# Patient Record
Sex: FEMALE | Race: WHITE | NOT HISPANIC OR LATINO | Employment: OTHER | URBAN - METROPOLITAN AREA
[De-identification: names, ages, dates, MRNs, and addresses within clinical notes are randomized per-mention and may not be internally consistent; named-entity substitution may affect disease eponyms.]

---

## 2017-12-27 ENCOUNTER — THERAPY VISIT (OUTPATIENT)
Dept: PHYSICAL THERAPY | Facility: CLINIC | Age: 62
End: 2017-12-27
Payer: COMMERCIAL

## 2017-12-27 DIAGNOSIS — M25.512 LEFT SHOULDER PAIN, UNSPECIFIED CHRONICITY: Primary | ICD-10-CM

## 2017-12-27 PROCEDURE — 97112 NEUROMUSCULAR REEDUCATION: CPT | Mod: GP | Performed by: PHYSICAL THERAPIST

## 2017-12-27 PROCEDURE — 97161 PT EVAL LOW COMPLEX 20 MIN: CPT | Mod: GP | Performed by: PHYSICAL THERAPIST

## 2017-12-27 NOTE — MR AVS SNAPSHOT
"              After Visit Summary   12/27/2017    Ada Rock    MRN: [id number]           Patient Information     Date Of Birth          1955        Visit Information        Provider Department      12/27/2017 4:00 PM Florida Aldridge, PT [facility] Athletic [facility] Physical [facility]        Today's Diagnoses     Left shoulder pain, unspecified chronicity    -  1       Follow-ups after your visit        Your next 10 appointments already scheduled     Feb 02, 2018 12:10 PM CST   JUDAH Extremity with Gerry Suero PT   [facility] Physical Therapy ([facility]  )    [address] [id number] [phone]              Who to contact     If you have questions or need follow up information about today's clinic visit or your schedule please contact [facility] directly at [phone].  Normal or non-critical lab and imaging results will be communicated to you by BlockScorehart, letter or phone within 4 business days after the clinic has received the results. If you do not hear from us within 7 days, please contact the clinic through BlockScorehart or phone. If you have a critical or abnormal lab result, we will notify you by phone as soon as possible.  Submit refill requests through Accolade or call your pharmacy and they will forward the refill request to us. Please allow 3 business days for your refill to be completed.          Additional Information About Your Visit        BlockScorehart Information     Accolade lets you send messages to your doctor, view your test results, renew your prescriptions, schedule appointments and more. To sign up, go to [email or web address] . Click on \"Log in\" on the left side of the screen, which will take you to the Welcome page. Then click on \"Sign up Now\" on the right side of the page.     You will be asked to enter the access code listed below, as well as some personal " information. Please follow the directions to create your username and password.     Your access code is: [email or web address]  Expires: 2018 11:10 AM     Your access code will  in 90 days. If you need help or a new code, please call your [facility] clinic or [phone].        Care EveryWhere ID     This is your Care EveryWhere ID. This could be used by other organizations to access your [facility] medical records  [id number]         Blood Pressure from Last 3 Encounters:   No data found for BP    Weight from Last 3 Encounters:   No data found for Wt              We Performed the Following     JUDAH Inital Eval Report     Neuromuscular Re-Education     PT Eval, Low Complexity ([id number])        Primary Care Provider Fax #    Physician No Ref-Primary [phone]       No address on file        Equal Access to Services     STEPHANIE JACKSON : Lima kelsey Somor, waaxda luqadaha, qaybta kaalmada adeegyada, tiffany antoine . So [facility] [phone].    ATENCIÓN: Si habla español, tiene a whipple disposición servicios gratuitos de asistencia lingüística. Llame al [phone].    We comply with applicable federal civil rights laws and [place] laws. We do not discriminate on the basis of race, color, national origin, age, disability, sex, sexual orientation, or gender identity.            Thank you!     Thank you for choosing [facility] PHYSICAL THERAPY  for your care. Our goal is always to provide you with excellent care. Hearing back from our patients is one way we can continue to improve our services. Please take a few minutes to complete the written survey that you may receive in the mail after your visit with us. Thank you!             Your Updated Medication List - Protect others around you: Learn how to safely use, store and throw away your medicines at www.disposemymeds.org.      Notice  As of 2017 11:59 PM    You have not been prescribed any  medications.

## 2017-12-27 NOTE — LETTER
[facility] ATHLETIC [facility] PHYSICAL [facility]  [address] [address]  [phone]    January 15, 2018    Re: Ada Rock   :   1955  MRN:  [id number]   REFERRING PHYSICIAN:   Mikhail Contreras    [facility] ATHLETIC [facility] PHYSICAL [facility]  Date of Initial Evaluation:  18  Visits:  Rxs Used: 1  Reason for Referral:  Left shoulder pain, unspecified chronicity    EVALUATION SUMMARY    [facility] Athletic [facility] Initial Evaluation  Subjective:  Patient is a 62 year old female presenting with rehab left shoulder hpi. The history is provided by the patient.   Ada Rock is a 62 year old female with a left shoulder condition.        Onset of L shoulder pain (upper trap area) in 2017 when she started riding a new road bike. Bike trip to [place] in September increased the pain. She notes the pain is manageable and does not limit her, but she wants to prevent it from getting worse. She is confident the bike frame is the right size for her, but she has not had the bike fitted for her. She plans to have the bike fitted over the winter.     L RC injury about 10 years ago, improved with PT. No residual pain. .    Site of Pain: L upper trap area.  Radiates to: no radiation.  Pain is described as aching and is intermittent Pain Scale: 0-4/10 pain range.  Associated with: denies associated symptoms. Worse during: not dependent upon the time of day.  Symptoms are exacerbated by using arm overhead (pain after riding bike - weight bearing through arms) and relieved by activity/movement (deep tissue massage).  Since onset symptoms are gradually improving.  Special tests:  X-ray.  Previous treatment: no treatment for current episode of pain other than massage.    General health as reported by patient is excellent.                  Objective:      Cervical/Thoracic Evaluation  Arom wnl cervical: Cervical AROM did not reproduce pain  complaints.       AROM:  AROM Cervical:  Flexion:            90%  Extension:       75%  Rotation:         Left: 75%     Right: 90%  Side Bend:      Left: 50%     Right:  50%    Shoulder Evaluation:  ROM:  AROM:  normal (except for slight endrange loss L shoulder with flexion and abduction without pain)    PROM:  normal  Re: Ada Rock    Strength:  normal (Global MMT of B shoulder 5/5, but with L scap weakness - tends to elevate and protract L scap with resistive testing. L scap dyskinesis with repeated movement testing.)    Special Tests:    Left shoulder negative for the following special tests:  Impingement and Rotator cuff tear  Right shoulder negative for the following special tests:Impingement and Rotator cuff tear    Palpation:  Palpation assessed shoulder: Increased tissue tension noted at L upper trap and L levator scapulae.    Mobility Tests:    Glenohumeral posterior left:  Normal  Glenohumeral posterior right:  Normal  Glenohumeral inferior left:  Normal  Glenohumeral inferior right:  Normal    Acromioclavicular left:  Normal  Acromioclavicular right:  Normal    Scapulothoracic left:  Normal  Scapulothoracic right:  Normal  Sternoclavicular left:  Normal  Sternoclavicular right:  Normal  Scapulohumeral rhythm right:  Hypermobile  Scapulohumeral rhythm right:  Normal       Assessment/Plan:    Patient is a 62 year old female with left side shoulder complaints.    Patient has the following significant findings with corresponding treatment plan.                Diagnosis 1:  L shoulder pain, scapular dyskinesis  Pain -  hot/cold therapy, manual therapy, self management, education and home program  Decreased ROM/flexibility - manual therapy, therapeutic exercise and home program  Decreased joint mobility - manual therapy, therapeutic exercise and home program  Decreased strength - therapeutic exercise, therapeutic activities and home program  Decreased function - therapeutic activities and home  program    Therapy Evaluation Codes:   1) History comprised of:   Personal factors that impact the plan of care:      None.    Comorbidity factors that impact the plan of care are:      None.     Medications impacting care: None.  2) Examination of Body Systems comprised of:   Body structures and functions that impact the plan of care:      Cervical spine, Shoulder and Thoracic Spine.   Activity limitations that impact the plan of care are:      Dressing, Sports and Reaching.  3) Clinical presentation characteristics are:   Stable/Uncomplicated.  4) Decision-Making    Low complexity using standardized patient assessment instrument and/or measureable assessment of functional outcome.  Cumulative Therapy Evaluation is: Low complexity.    Previous and current functional limitations:  (See Goal Flow Sheet for this information)    Short term and Long term goals: (See Goal Flow Sheet for this information)     Communication ability:  Patient appears to be able to clearly communicate and understand verbal and written communication and follow directions correctly.  Treatment Explanation - The following has been discussed with the patient:   RX ordered/plan of care  Anticipated outcomes  Possible risks and side effects  Re: Ada Rock    This patient would benefit from PT intervention to resume normal activities.   Rehab potential is excellent.    Frequency:  1 X week, once daily  Duration:  for 3 weeks tapering to 2 X a month over 6 weeks  Discharge Plan:  Achieve all LTG.  Independent in home treatment program.  Reach maximal therapeutic benefit.    Please refer to the daily flowsheet for treatment today, total treatment time and time spent performing 1:1 timed codes.     Thank you for your referral.    INQUIRIES  Therapist: Florida Aldridge DPT  [facility] PHYSICAL THERAPY  [address] [place] [id number]  Phone: [phone]  Fax: [phone]

## 2017-12-27 NOTE — PROGRESS NOTES
[facility] Initial Evaluation  Subjective:  Patient is a 62 year old female presenting with rehab left shoulder hpi. The history is provided by the patient.   Ada Rock is a 62 year old female with a left shoulder condition.        Onset of L shoulder pain (upper trap area) in June 2017 when she started riding a new road bike. Bike trip to [place] in September increased the pain. She notes the pain is manageable and does not limit her, but she wants to prevent it from getting worse. She is confident the bike frame is the right size for her, but she has not had the bike fitted for her. She plans to have the bike fitted over the winter.     L RC injury about 10 years ago, improved with PT. No residual pain. .    Site of Pain: L upper trap area.  Radiates to: no radiation.  Pain is described as aching and is intermittent Pain Scale: 0-4/10 pain range.  Associated with: denies associated symptoms. Worse during: not dependent upon the time of day.  Symptoms are exacerbated by using arm overhead (pain after riding bike - weight bearing through arms) and relieved by activity/movement (deep tissue massage).  Since onset symptoms are gradually improving.  Special tests:  X-ray.  Previous treatment: no treatment for current episode of pain other than massage.    General health as reported by patient is excellent.                                              Objective:  System              Cervical/Thoracic Evaluation  Arom wnl cervical: Cervical AROM did not reproduce pain complaints.     AROM:  AROM Cervical:    Flexion:            90%  Extension:       75%  Rotation:         Left: 75%     Right: 90%  Side Bend:      Left: 50%     Right:  50%                               Shoulder Evaluation:  ROM:  AROM:  normal (except for slight endrange loss L shoulder with flexion and abduction without pain)                            PROM:  normal                                Strength:  normal  (Global MMT of B shoulder 5/5, but with L scap weakness - tends to elevate and protract L scap with resistive testing. L scap dyskinesis with repeated movement testing.)                        Special Tests:      Left shoulder negative for the following special tests:  Impingement and Rotator cuff tear    Right shoulder negative for the following special tests:Impingement and Rotator cuff tear  Palpation:  Palpation assessed shoulder: Increased tissue tension noted at L upper trap and L levator scapulae.      Mobility Tests:      Glenohumeral posterior left:  Normal  Glenohumeral posterior right:  Normal  Glenohumeral inferior left:  Normal  Glenohumeral inferior right:  Normal    Acromioclavicular left:  Normal  Acromioclavicular right:  Normal    Scapulothoracic left:  Normal  Scapulothoracic right:  Normal  Sternoclavicular left:  Normal  Sternoclavicular right:  Normal  Scapulohumeral rhythm right:  Hypermobile  Scapulohumeral rhythm right:  Normal                                   General     ROS    Assessment/Plan:    Patient is a 62 year old female with left side shoulder complaints.    Patient has the following significant findings with corresponding treatment plan.                Diagnosis 1:  L shoulder pain, scapular dyskinesis  Pain -  hot/cold therapy, manual therapy, self management, education and home program  Decreased ROM/flexibility - manual therapy, therapeutic exercise and home program  Decreased joint mobility - manual therapy, therapeutic exercise and home program  Decreased strength - therapeutic exercise, therapeutic activities and home program  Decreased function - therapeutic activities and home program    Therapy Evaluation Codes:   1) History comprised of:   Personal factors that impact the plan of care:      None.    Comorbidity factors that impact the plan of care are:      None.     Medications impacting care: None.  2) Examination of Body Systems comprised of:   Body structures and  functions that impact the plan of care:      Cervical spine, Shoulder and Thoracic Spine.   Activity limitations that impact the plan of care are:      Dressing, Sports and Reaching.  3) Clinical presentation characteristics are:   Stable/Uncomplicated.  4) Decision-Making    Low complexity using standardized patient assessment instrument and/or measureable assessment of functional outcome.  Cumulative Therapy Evaluation is: Low complexity.    Previous and current functional limitations:  (See Goal Flow Sheet for this information)    Short term and Long term goals: (See Goal Flow Sheet for this information)     Communication ability:  Patient appears to be able to clearly communicate and understand verbal and written communication and follow directions correctly.  Treatment Explanation - The following has been discussed with the patient:   RX ordered/plan of care  Anticipated outcomes  Possible risks and side effects  This patient would benefit from PT intervention to resume normal activities.   Rehab potential is excellent.    Frequency:  1 X week, once daily  Duration:  for 3 weeks tapering to 2 X a month over 6 weeks  Discharge Plan:  Achieve all LTG.  Independent in home treatment program.  Reach maximal therapeutic benefit.    Please refer to the daily flowsheet for treatment today, total treatment time and time spent performing 1:1 timed codes.

## 2018-01-05 ENCOUNTER — THERAPY VISIT (OUTPATIENT)
Dept: PHYSICAL THERAPY | Facility: CLINIC | Age: 63
End: 2018-01-05
Payer: COMMERCIAL

## 2018-01-05 DIAGNOSIS — M25.512 LEFT SHOULDER PAIN, UNSPECIFIED CHRONICITY: Primary | ICD-10-CM

## 2018-01-05 PROCEDURE — 97110 THERAPEUTIC EXERCISES: CPT | Mod: GP | Performed by: PHYSICAL THERAPIST

## 2018-01-05 PROCEDURE — 97112 NEUROMUSCULAR REEDUCATION: CPT | Mod: GP | Performed by: PHYSICAL THERAPIST

## 2018-01-12 PROBLEM — M25.512 SHOULDER PAIN, LEFT: Status: ACTIVE | Noted: 2018-01-12

## 2018-07-09 ENCOUNTER — OFFICE VISIT (OUTPATIENT)
Dept: URGENT CARE | Facility: URGENT CARE | Age: 63
End: 2018-07-09
Payer: COMMERCIAL

## 2018-07-09 VITALS
SYSTOLIC BLOOD PRESSURE: 110 MMHG | BODY MASS INDEX: 18.78 KG/M2 | OXYGEN SATURATION: 97 % | HEIGHT: 64 IN | TEMPERATURE: 98 F | HEART RATE: 70 BPM | RESPIRATION RATE: 16 BRPM | WEIGHT: 110 LBS | DIASTOLIC BLOOD PRESSURE: 60 MMHG

## 2018-07-09 DIAGNOSIS — J06.9 VIRAL UPPER RESPIRATORY TRACT INFECTION: Primary | ICD-10-CM

## 2018-07-09 PROCEDURE — 99203 OFFICE O/P NEW LOW 30 MIN: CPT | Performed by: PHYSICIAN ASSISTANT

## 2018-07-09 RX ORDER — CODEINE PHOSPHATE AND GUAIFENESIN 10; 100 MG/5ML; MG/5ML
1 SOLUTION ORAL EVERY 4 HOURS PRN
Qty: 120 ML | Refills: 0 | Status: SHIPPED | OUTPATIENT
Start: 2018-07-09 | End: 2023-01-17

## 2018-07-09 RX ORDER — ALBUTEROL SULFATE 90 UG/1
2 AEROSOL, METERED RESPIRATORY (INHALATION) EVERY 6 HOURS PRN
Qty: 1 INHALER | Refills: 0 | Status: SHIPPED | OUTPATIENT
Start: 2018-07-09 | End: 2023-01-17

## 2018-07-10 NOTE — PROGRESS NOTES
"SUBJECTIVE:  Ada Rock is a 63 year old female who presents to the clinic today with a chief complaint of cough  for 5 day(s). Patient was recently hiking in [place] [facility] and developed a cough.   Her cough is described as productive and wheezy at night.    The patient's symptoms are moderate and stable.  Associated symptoms include wheezing. The patient's symptoms are exacerbated by no particular triggers  Patient has been using OTC cough suppressants  to improve symptoms.    No past medical history on file.    Current Outpatient Prescriptions   Medication Sig Dispense Refill     albuterol (PROAIR HFA/PROVENTIL HFA/VENTOLIN HFA) 108 (90 Base) MCG/ACT Inhaler Inhale 2 puffs into the lungs every 6 hours as needed for shortness of breath / dyspnea or wheezing 1 Inhaler 0     guaiFENesin-codeine (ROBITUSSIN AC) 100-10 MG/5ML SOLN solution Take 5 mLs by mouth every 4 hours as needed for cough 120 mL 0       Social History   Substance Use Topics     Smoking status: Never Smoker     Smokeless tobacco: Never Used     Alcohol use Not on file       ROS  10 Review of systems negative except as stated above.    OBJECTIVE:  /60  Pulse 70  Temp 98  F (36.7  C) (Oral)  Resp 16  Ht 5' 4\" (1.626 m)  Wt 110 lb (49.9 kg)  SpO2 97%  Breastfeeding? No  BMI 18.88 kg/m2  GENERAL APPEARANCE: healthy, alert and no distress  EYES: EOMI,  PERRL, conjunctiva clear  HENT: ear canals and TM's normal.  Nose and mouth without ulcers, erythema or lesions  NECK: supple, nontender, no lymphadenopathy  RESP: lungs clear to auscultation - no rales, rhonchi or wheezes  CV: regular rates and rhythm, normal S1 S2, no murmur noted  NEURO: Normal strength and tone, sensory exam grossly normal,  normal speech and mentation  SKIN: no suspicious lesions or rashes    ASSESSMENT / PLAN:  1. Viral upper respiratory tract infection  Symptomatic measures encouraged, humidified air, plenty of fluids.  - guaiFENesin-codeine (ROBITUSSIN AC) 100-10 " MG/5ML SOLN solution; Take 5 mLs by mouth every 4 hours as needed for cough  Dispense: 120 mL; Refill: 0  - albuterol (PROAIR HFA/PROVENTIL HFA/VENTOLIN HFA) 108 (90 Base) MCG/ACT Inhaler; Inhale 2 puffs into the lungs every 6 hours as needed for shortness of breath / dyspnea or wheezing  Dispense: 1 Inhaler; Refill: 0    Flory Cee PA-C

## 2019-02-28 ENCOUNTER — THERAPY VISIT (OUTPATIENT)
Dept: PHYSICAL THERAPY | Facility: CLINIC | Age: 64
End: 2019-02-28
Payer: COMMERCIAL

## 2019-02-28 DIAGNOSIS — M75.01 ADHESIVE CAPSULITIS OF RIGHT SHOULDER: ICD-10-CM

## 2019-02-28 PROCEDURE — 97112 NEUROMUSCULAR REEDUCATION: CPT | Mod: GP | Performed by: PHYSICAL THERAPIST

## 2019-02-28 PROCEDURE — 97110 THERAPEUTIC EXERCISES: CPT | Mod: GP | Performed by: PHYSICAL THERAPIST

## 2019-02-28 PROCEDURE — 97162 PT EVAL MOD COMPLEX 30 MIN: CPT | Mod: GP | Performed by: PHYSICAL THERAPIST

## 2019-02-28 NOTE — LETTER
[facility] ATHLETIC [facility] PHYSICAL [facility]  [address] [address]  [phone]  2019    Re: Ada Rock   :   1955  MRN:  [id number]   REFERRING PHYSICIAN:  Deepika Reardon MD    [facility]    Date of Initial Evaluation:  19  Visits:  Rxs Used: 1  Reason for Referral:  Adhesive capsulitis of right shoulder    EVALUATION SUMMARY    [facility]    Subjective:  Pt presents to PT with a chief complaint of R shoulder pain and stiffness with reported onset in 10/2018 with correlation to overuse with biking and lifting. Pt received cortizone injection yesterday and reports improvement in pain but continues to have c/o stiffness.   Ada Rock is a 63 year old female with a right shoulder condition.  Condition occurred with:  Lifting and repetition/overuse.  Condition occurred: during recreation/sport.  This is a new condition  10/2018.    Patient reports pain:  Anterior and lateral.  Radiates to:  Upper arm.  Pain is described as aching and is intermittent and reported as 8/10.  Associated symptoms:  Loss of motion/stiffness and loss of strength. Pain is worse in the P.M. and worse during the night.  Symptoms are exacerbated by lying on extremity, using arm at shoulder level, lifting and using arm overhead and relieved by NSAID's.  Since onset symptoms are gradually improving.        General health as reported by patient is excellent.  Pertinent medical history includes:  None.  Medical allergies: no.  Other surgeries include:  None reported.  Current medications:  None as reported by the patient.  Current occupation is dentist.  Patient is working in normal job without restrictions.  Primary job tasks include:  Prolonged standing and repetitive tasks.  Barriers include:  None as reported by the patient.  Red flags:  None as reported by the  patient.    Objective:  Standing Alignment:    Cervical/Thoracic:  Forward head  Shoulder/UE:  Rounded shoulders    Shoulder Evaluation:  ROM:  PROM:    Flexion:  Left:  180    Right: 120    Abduction:  Left:  180    Right:  140  External Rotation:  Left:  70    Right:  40  Pain: pain w/ end range R shoulder PROM=AROM  Re: Ada Rock   :   1955    Strength:    Flexion: Left:5/5   Pain:    Right: 4+/5      Pain:  +  Abduction:  Left: 5-/5  Pain:    Right: 4+/5      Pain:+  Internal Rotation:  Left:5-/5     Pain:    Right: 5-/5      Pain:+  External Rotation:   Left:4+/5     Pain:   Right:4+/5     Pain:      Mobility Tests:    Glenohumeral posterior right:  Hypomobile    Assessment/Plan:    Patient is a 63 year old female with right side shoulder complaints.    Patient has the following significant findings with corresponding treatment plan.                Diagnosis 1:  R adhesive capsulitis  Pain -  manual therapy, splint/taping/bracing/orthotics, self management and education  Decreased ROM/flexibility - manual therapy and therapeutic exercise  Decreased joint mobility - manual therapy and therapeutic exercise  Decreased strength - therapeutic exercise and therapeutic activities  Impaired muscle performance - neuro re-education  Impaired posture - neuro re-education    Therapy Evaluation Codes:   1) History comprised of:   Personal factors that impact the plan of care:      None.    Comorbidity factors that impact the plan of care are:      None.     Medications impacting care: None.  2) Examination of Body Systems comprised of:   Body structures and functions that impact the plan of care:      Shoulder.   Activity limitations that impact the plan of care are:      Bathing, Cooking, Dressing and Lifting.  3) Clinical presentation characteristics are:   Evolving/Changing.  4) Decision-Making    Moderate complexity using standardized patient assessment instrument and/or   measureable assessment of  functional outcome.  Cumulative Therapy Evaluation is: Moderate complexity.    Previous and current functional limitations:  (See Goal Flow Sheet for this information)    Short term and Long term goals: (See Goal Flow Sheet for this information)     Communication ability:  Patient appears to be able to clearly communicate and understand verbal and written communication and follow directions correctly.  Treatment Explanation - The following has been discussed with the patient:   RX ordered/plan of care  Anticipated outcomes  Possible risks and side effects  This patient would benefit from PT intervention to resume normal activities.   Rehab potential is good.  Re: Ada Rock   :   1955    Frequency:  2 X week, once daily  Duration:  for 4 weeks tapering to 2 X a month for 2 months  Discharge Plan:  Achieve all LTG.  Independent in home treatment program.  Reach maximal therapeutic benefit.    Please refer to the daily flowsheet for treatment today, total treatment time and time spent performing 1:1 timed codes.         Thank you for your referral.    INQUIRIES  Therapist: Gerry Suero DPT  [facility] PHYSICAL THERAPY  [address] [place] [id number]  Phone: [phone]  Fax: [phone]

## 2019-03-04 PROBLEM — M75.01 ADHESIVE CAPSULITIS OF RIGHT SHOULDER: Status: ACTIVE | Noted: 2019-03-04

## 2019-03-05 ENCOUNTER — THERAPY VISIT (OUTPATIENT)
Dept: PHYSICAL THERAPY | Facility: CLINIC | Age: 64
End: 2019-03-05
Payer: COMMERCIAL

## 2019-03-05 DIAGNOSIS — M75.01 ADHESIVE CAPSULITIS OF RIGHT SHOULDER: ICD-10-CM

## 2019-03-05 PROCEDURE — 97110 THERAPEUTIC EXERCISES: CPT | Mod: GP | Performed by: PHYSICAL THERAPIST

## 2019-03-05 PROCEDURE — 97112 NEUROMUSCULAR REEDUCATION: CPT | Mod: GP | Performed by: PHYSICAL THERAPIST

## 2019-03-05 PROCEDURE — 97140 MANUAL THERAPY 1/> REGIONS: CPT | Mod: GP | Performed by: PHYSICAL THERAPIST

## 2019-03-05 NOTE — PROGRESS NOTES
[facility] Initial Evaluation    Subjective:  Pt presents to PT with a chief complaint of R shoulder pain and stiffness with reported onset in 10/2018 with correlation to overuse with biking and lifting. Pt received cortizone injection yesterday and reports improvement in pain but continues to have c/o stiffness.       Ada Rock is a 63 year old female with a right shoulder condition.  Condition occurred with:  Lifting and repetition/overuse.  Condition occurred: during recreation/sport.  This is a new condition  10/2018.    Patient reports pain:  Anterior and lateral.  Radiates to:  Upper arm.  Pain is described as aching and is intermittent and reported as 8/10.  Associated symptoms:  Loss of motion/stiffness and loss of strength. Pain is worse in the P.M. and worse during the night.  Symptoms are exacerbated by lying on extremity, using arm at shoulder level, lifting and using arm overhead and relieved by NSAID's.  Since onset symptoms are gradually improving.        General health as reported by patient is excellent.  Pertinent medical history includes:  None.  Medical allergies: no.  Other surgeries include:  None reported.  Current medications:  None as reported by the patient.  Current occupation is dentist.  Patient is working in normal job without restrictions.  Primary job tasks include:  Prolonged standing and repetitive tasks.    Barriers include:  None as reported by the patient.    Red flags:  None as reported by the patient.                        Objective:  Standing Alignment:    Cervical/Thoracic:  Forward head  Shoulder/UE:  Rounded shoulders                                       Shoulder Evaluation:  ROM:    PROM:    Flexion:  Left:  180    Right: 120      Abduction:  Left:  180    Right:  140      External Rotation:  Left:  70    Right:  40                Pain: pain w/ end range R shoulder PROM=AROM    Strength:    Flexion: Left:5/5   Pain:    Right: 4+/5      Pain:   +    Abduction:  Left: 5-/5  Pain:    Right: 4+/5      Pain:+    Internal Rotation:  Left:5-/5     Pain:    Right: 5-/5      Pain:+  External Rotation:   Left:4+/5     Pain:   Right:4+/5     Pain:                  Mobility Tests:      Glenohumeral posterior right:  Hypomobile                                             General     ROS    Assessment/Plan:    Patient is a 63 year old female with right side shoulder complaints.    Patient has the following significant findings with corresponding treatment plan.                Diagnosis 1:  R adhesive capsulitis  Pain -  manual therapy, splint/taping/bracing/orthotics, self management and education  Decreased ROM/flexibility - manual therapy and therapeutic exercise  Decreased joint mobility - manual therapy and therapeutic exercise  Decreased strength - therapeutic exercise and therapeutic activities  Impaired muscle performance - neuro re-education  Impaired posture - neuro re-education    Therapy Evaluation Codes:   1) History comprised of:   Personal factors that impact the plan of care:      None.    Comorbidity factors that impact the plan of care are:      None.     Medications impacting care: None.  2) Examination of Body Systems comprised of:   Body structures and functions that impact the plan of care:      Shoulder.   Activity limitations that impact the plan of care are:      Bathing, Cooking, Dressing and Lifting.  3) Clinical presentation characteristics are:   Evolving/Changing.  4) Decision-Making    Moderate complexity using standardized patient assessment instrument and/or measureable assessment of functional outcome.  Cumulative Therapy Evaluation is: Moderate complexity.    Previous and current functional limitations:  (See Goal Flow Sheet for this information)    Short term and Long term goals: (See Goal Flow Sheet for this information)     Communication ability:  Patient appears to be able to clearly communicate and understand verbal and written  communication and follow directions correctly.  Treatment Explanation - The following has been discussed with the patient:   RX ordered/plan of care  Anticipated outcomes  Possible risks and side effects  This patient would benefit from PT intervention to resume normal activities.   Rehab potential is good.    Frequency:  2 X week, once daily  Duration:  for 4 weeks tapering to 2 X a month for 2 months  Discharge Plan:  Achieve all LTG.  Independent in home treatment program.  Reach maximal therapeutic benefit.    Please refer to the daily flowsheet for treatment today, total treatment time and time spent performing 1:1 timed codes.

## 2019-03-14 ENCOUNTER — THERAPY VISIT (OUTPATIENT)
Dept: PHYSICAL THERAPY | Facility: CLINIC | Age: 64
End: 2019-03-14
Payer: COMMERCIAL

## 2019-03-14 DIAGNOSIS — M75.01 ADHESIVE CAPSULITIS OF RIGHT SHOULDER: ICD-10-CM

## 2019-03-14 PROCEDURE — 97112 NEUROMUSCULAR REEDUCATION: CPT | Mod: GP | Performed by: PHYSICAL THERAPIST

## 2019-03-14 PROCEDURE — 97140 MANUAL THERAPY 1/> REGIONS: CPT | Mod: GP | Performed by: PHYSICAL THERAPIST

## 2019-03-14 PROCEDURE — 97110 THERAPEUTIC EXERCISES: CPT | Mod: GP | Performed by: PHYSICAL THERAPIST

## 2019-03-28 ENCOUNTER — THERAPY VISIT (OUTPATIENT)
Dept: PHYSICAL THERAPY | Facility: CLINIC | Age: 64
End: 2019-03-28
Payer: COMMERCIAL

## 2019-03-28 DIAGNOSIS — M75.01 ADHESIVE CAPSULITIS OF RIGHT SHOULDER: ICD-10-CM

## 2019-03-28 PROCEDURE — 97112 NEUROMUSCULAR REEDUCATION: CPT | Mod: GP | Performed by: PHYSICAL THERAPIST

## 2019-03-28 PROCEDURE — 97110 THERAPEUTIC EXERCISES: CPT | Mod: GP | Performed by: PHYSICAL THERAPIST

## 2019-03-28 NOTE — LETTER
[facility] PHYSICAL THERAPY  [address] [address]  [phone]    2019    Re: Ada Rock   :   1955  MRN:  [id number]   REFERRING PHYSICIAN:   Deepika Reardon MD    [facility] PHYSICAL [facility]    Date of Initial Evaluation:  2019  Visits:  Rxs Used: 4  Reason for Referral:  Adhesive capsulitis of right shoulder    PROGRESS  REPORT    Progress reporting period is from 19 to 3/28/19. Pt has attended 4 total PT sessions addressing patient's chief complaints of R shoulder adhesive capsulitis with reported onset in 10/2018 with correlation to overuse with biking and lifting. Pt received CSI and reported significant improvement in pain and PT has focused on a progressive shoulder ROM program with recent capsular stretching. Patient has demonstrated significant improvement in R shoulder AROM and PROM and is appropriate for progression to strengthening program and eventual independent HEP w/ f/u as needed        SUBJECTIVE  Subjective: Pt reports doing much better overall and has been very pleased with her progress. Pt does still acknowledge some tightness with certain shoulder motions (reaching across body) but much improved overall. HEP going well     Current Pain level: 3/10.     Initial Pain level: 8/10.   Changes in function:  Yes (See Goal flowsheet attached for changes in current functional level)  Adverse reaction to treatment or activity: None    OBJECTIVE  Changes noted in objective findings:      R shoulder AROM:  Flexion: 160 deg  Abduction: 160 deg  ER: 60 deg  IR: thumb to T8      Painfree resisted shoulder ER, IR, Abduction, and Flexion    ASSESSMENT/PLAN  Updated problem list and treatment plan: Diagnosis 1:  R shoulder adhesive capsulitis    STG/LTGs have been met or progress has been made towards goals:  Yes (See Goal flow sheet completed today.)  Re: Ada Rock   :    1955    Assessment of Progress: The patient's condition is improving.  Self Management Plans:  Patient has been instructed in a home treatment program.  I have re-evaluated this patient and find that the nature, scope, duration and intensity of the therapy is appropriate for the medical condition of the patient.  Ada continues to require the following intervention to meet STG and LTG's:  PT    Recommendations:  This patient would benefit from continued therapy.     Frequency:  2 X a month, once daily  Duration:  for 1 months    Please refer to the daily flowsheet for treatment today, total treatment time and time spent performing 1:1 timed codes.    Thank you for your referral.      INQUIRIES  Therapist: Gerry Suero, PT, DPT  [facility] PHYSICAL THERAPY  [address] [place] [id number]  Phone: [phone]  Fax: [phone]

## 2019-03-29 NOTE — PROGRESS NOTES
PROGRESS  REPORT    Progress reporting period is from 2/28/19 to 3/28/19. Pt has attended 4 total PT sessions addressing patient's chief complaints of R shoulder adhesive capsulitis with reported onset in 10/2018 with correlation to overuse with biking and lifting. Pt received CSI and reported significant improvement in pain and PT has focused on a progressive shoulder ROM program with recent capsular stretching. Patient has demonstrated significant improvement in R shoulder AROM and PROM and is appropriate for progression to strengthening program and eventual independent HEP w/ f/u as needed         SUBJECTIVE  Subjective: Pt reports doing much better overall and has been very pleased with her progress. Pt does still acknowledge some tightness with certain shoulder motions (reaching across body) but much improved overall. HEP going well     Current Pain level: 3/10.     Initial Pain level: 8/10.   Changes in function:  Yes (See Goal flowsheet attached for changes in current functional level)  Adverse reaction to treatment or activity: None    OBJECTIVE  Changes noted in objective findings:      R shoulder AROM:  Flexion: 160 deg  Abduction: 160 deg  ER: 60 deg  IR: thumb to T8      Painfree resisted shoulder ER, IR, Abduction, and Flexion    ASSESSMENT/PLAN  Updated problem list and treatment plan: Diagnosis 1:  R shoulder adhesive capsulitis    STG/LTGs have been met or progress has been made towards goals:  Yes (See Goal flow sheet completed today.)  Assessment of Progress: The patient's condition is improving.  Self Management Plans:  Patient has been instructed in a home treatment program.  I have re-evaluated this patient and find that the nature, scope, duration and intensity of the therapy is appropriate for the medical condition of the patient.  Ada continues to require the following intervention to meet STG and LTG's:  PT    Recommendations:  This patient would benefit from continued therapy.      Frequency:  2 X a month, once daily  Duration:  for 1 months        Please refer to the daily flowsheet for treatment today, total treatment time and time spent performing 1:1 timed codes.

## 2019-04-18 ENCOUNTER — THERAPY VISIT (OUTPATIENT)
Dept: PHYSICAL THERAPY | Facility: CLINIC | Age: 64
End: 2019-04-18
Payer: COMMERCIAL

## 2019-04-18 DIAGNOSIS — M75.01 ADHESIVE CAPSULITIS OF RIGHT SHOULDER: ICD-10-CM

## 2019-04-18 PROCEDURE — 97112 NEUROMUSCULAR REEDUCATION: CPT | Mod: GP | Performed by: PHYSICAL THERAPIST

## 2019-04-18 PROCEDURE — 97110 THERAPEUTIC EXERCISES: CPT | Mod: GP | Performed by: PHYSICAL THERAPIST

## 2019-11-14 ENCOUNTER — THERAPY VISIT (OUTPATIENT)
Dept: PHYSICAL THERAPY | Facility: CLINIC | Age: 64
End: 2019-11-14
Payer: COMMERCIAL

## 2019-11-14 DIAGNOSIS — M75.01 ADHESIVE CAPSULITIS OF RIGHT SHOULDER: ICD-10-CM

## 2019-11-14 PROCEDURE — 97161 PT EVAL LOW COMPLEX 20 MIN: CPT | Mod: GP | Performed by: PHYSICAL THERAPIST

## 2019-11-14 PROCEDURE — 97110 THERAPEUTIC EXERCISES: CPT | Mod: GP | Performed by: PHYSICAL THERAPIST

## 2019-11-14 PROCEDURE — 97112 NEUROMUSCULAR REEDUCATION: CPT | Mod: GP | Performed by: PHYSICAL THERAPIST

## 2019-11-15 PROBLEM — M25.512 SHOULDER PAIN, LEFT: Status: RESOLVED | Noted: 2018-01-12 | Resolved: 2019-11-15

## 2019-11-15 NOTE — PROGRESS NOTES
PROGRESS  REPORT    Progress reporting period is from 3/28/19 to 11/14/19. Pt returns to PT with a flare up of her previous R shoulder pain associated with adhesive capsulitis. Pt previously attended 4 PT sessions (02/2019 to 03/2019) addressing patient's chief complaints of R shoulder adhesive capsulitis with reported onset in 10/2018 with correlation to overuse with biking and lifting. Pt reported significant improvement in pain and ROM with a primary treatment emphasis on a progressive shoulder ROM program with capsular stretching and a home strengthening program.     Pt reports poor adherence to her HEP and had a recent flare up of her shoulder pain this past month 10/2019. Pt had a f/u with MD who recommended PT for progressive shoulder ROM and eventual scapular stabilization. MRI revealed moderate RTC tendinosis w/o tearing. Pt received another CSI last week and reports considerable improvement in pain, however feels her R shoulder stiffness has persisted. Pt hoping to return to her previously successful PT program          SUBJECTIVE  Subjective changes noted by patient:  Pt reports increased R lateral shoulder pain over the past month. Potential correlation to completing a tooth extraction at work   Current pain level is 5/10  .     Previous pain level was  8/10  .   Changes in function:  Yes (See Goal flowsheet attached for changes in current functional level)  Adverse reaction to treatment or activity: None    OBJECTIVE  Changes noted in objective findings:  Yes,       R shoulder AROM:  Flexion: 130 deg  Abduction: 130 deg  ER: 20 deg  IR: thumb to L1       Pain with resisted abduction (4/5, pain ++), ER (4/5, pain ++), and IR (5/5, pain +)    Discussed plans to return to shoulder AAROM with eventual capsular stretching and scapular stabilization    ASSESSMENT/PLAN  Updated problem list and treatment plan: Diagnosis 1:  R shoulder pain, adhesive capsulitis  Pain -  manual therapy,  splint/taping/bracing/orthotics, self management and education  Decreased ROM/flexibility - manual therapy and therapeutic exercise  Decreased joint mobility - manual therapy and therapeutic exercise  Decreased strength - therapeutic exercise and therapeutic activities  Impaired muscle performance - neuro re-education  STG/LTGs have been met or progress has been made towards goals:  Yes (See Goal flow sheet completed today.)  Assessment of Progress: The patient's condition is improving.  Self Management Plans:  Patient has been instructed in a home treatment program.  Patient is independent in a home treatment program.  I have re-evaluated this patient and find that the nature, scope, duration and intensity of the therapy is appropriate for the medical condition of the patient.  Ada continues to require the following intervention to meet STG and LTG's:  PT    Recommendations:  This patient would benefit from continued therapy.     Frequency:  1 X week, once daily  Duration:  for 6 weeks then 2x month for 1 month    This patient is ready to be discharged from therapy and continue their home treatment program.    Please refer to the daily flowsheet for treatment today, total treatment time and time spent performing 1:1 timed codes.

## 2019-11-29 ENCOUNTER — THERAPY VISIT (OUTPATIENT)
Dept: PHYSICAL THERAPY | Facility: CLINIC | Age: 64
End: 2019-11-29
Payer: COMMERCIAL

## 2019-11-29 DIAGNOSIS — M75.01 ADHESIVE CAPSULITIS OF RIGHT SHOULDER: ICD-10-CM

## 2019-11-29 PROCEDURE — 97140 MANUAL THERAPY 1/> REGIONS: CPT | Mod: GP | Performed by: PHYSICAL THERAPIST

## 2019-11-29 PROCEDURE — 97112 NEUROMUSCULAR REEDUCATION: CPT | Mod: GP | Performed by: PHYSICAL THERAPIST

## 2019-11-29 PROCEDURE — 97110 THERAPEUTIC EXERCISES: CPT | Mod: GP | Performed by: PHYSICAL THERAPIST

## 2019-12-12 ENCOUNTER — THERAPY VISIT (OUTPATIENT)
Dept: PHYSICAL THERAPY | Facility: CLINIC | Age: 64
End: 2019-12-12
Payer: COMMERCIAL

## 2019-12-12 DIAGNOSIS — M75.01 ADHESIVE CAPSULITIS OF RIGHT SHOULDER: ICD-10-CM

## 2019-12-12 PROCEDURE — 97112 NEUROMUSCULAR REEDUCATION: CPT | Mod: GP | Performed by: PHYSICAL THERAPIST

## 2019-12-12 PROCEDURE — 97110 THERAPEUTIC EXERCISES: CPT | Mod: GP | Performed by: PHYSICAL THERAPIST

## 2019-12-16 NOTE — PROGRESS NOTES
Discharge Note    Progress reporting period is from last progress note on 03/28/19 to Apr 18, 2019.    Ada came for 1 of 2 planned follow up appointments.  Please see information below for last relevant information on current status.  Patient seen for 5 visits.    SUBJECTIVE  Subjective changes noted by patient:  Doing well overall, denies any shoulder pain. Still has some stiffness at end range elevation but much better overall  .  Current pain level is 0/10.     Previous pain level was  8/10.   Changes in function:  Yes (See Goal flowsheet attached for changes in current functional level)  Adverse reaction to treatment or activity: None    OBJECTIVE  Changes noted in objective findings: Mild loss of R shoulder elevation, no significant change from last session. Tolerated progression in hold time with capsular stretches     ASSESSMENT/PLAN  Diagnosis: R adhesive capsulitis   Updated problem list and treatment plan:   Pain - HEP  Decreased ROM/flexibility - HEP  Decreased strength - HEP  Impaired muscle performance - HEP  Impaired posture - HEP  Decreased joint mobility - HEP  STG/LTGs have been met or progress has been made towards goals:  Yes, please see goal flowsheet for most current information  Assessment of Progress: current status is unknown.    Last current status: Pt is progressing well   Self Management Plans:  HEP  I have re-evaluated this patient and find that the nature, scope, duration and intensity of the therapy is appropriate for the medical condition of the patient.  Ada continues to require the following intervention to meet STG and LTG's:  HEP.    Recommendations:  Discharge with current home program.  Patient to follow up with MD as needed.    Please refer to the daily flowsheet for treatment today, total treatment time and time spent performing 1:1 timed codes.

## 2019-12-27 ENCOUNTER — THERAPY VISIT (OUTPATIENT)
Dept: PHYSICAL THERAPY | Facility: CLINIC | Age: 64
End: 2019-12-27
Payer: COMMERCIAL

## 2019-12-27 DIAGNOSIS — M75.01 ADHESIVE CAPSULITIS OF RIGHT SHOULDER: ICD-10-CM

## 2019-12-27 PROCEDURE — 97112 NEUROMUSCULAR REEDUCATION: CPT | Mod: GP | Performed by: PHYSICAL THERAPIST

## 2019-12-27 PROCEDURE — 97110 THERAPEUTIC EXERCISES: CPT | Mod: GP | Performed by: PHYSICAL THERAPIST

## 2019-12-27 NOTE — LETTER
[facility] PHYSICAL THERAPY  [address] [place] [address]  [phone]    2019    Re: Ada Rock   :   1955  MRN:  [id number]   REFERRING PHYSICIAN:   Sebastian Tang MD    [facility] ATHLETIC [facility] PHYSICAL [facility]    Date of Initial Evaluation:  19  Visits:  Rxs Used: 9  Reason for Referral:  Adhesive capsulitis of right shoulder        PROGRESS  REPORT    Progress reporting period is from 19 to 19. Pt has attended 4 PT sessions addressing patient's exacerbation of her R shoulder pain associated with adhesive capsulitis. Pt previously attended PT sessions (2019 to 2019) addressing patient's chief complaints of R shoulder adhesive capsulitis with reported onset in 10/2018 with correlation to overuse with biking and lifting. Pt reported significant improvement in pain and ROM with a primary treatment emphasis on a progressive shoulder ROM program with capsular stretching and a home strengthening program.     However, pt reported poor adherence to her HEP and had a flare up of her shoulder pain in 10/2019. Pt had a f/u with MD who recommended PT for progressive shoulder ROM and eventual scapular stabilization. MRI revealed moderate RTC tendinosis w/o tearing. Pt received another CSI in 2019 and reported considerable improvement in pain, and tolerated a progressive shoulder ROM/stretching program with eventual progression to capsular stretching and a home RTC/scapular strengthening program. Pt to continue HEP independently and f/u as needed    SUBJECTIVE  Subjective: Pt reports doin very well overall and has been pleased with her progress. Still has some R shoulder soreness with reaching overhead, but much improved    Current Pain level: 1/10.     Initial Pain level: 8/10.   Changes in function:  Yes (See Goal flowsheet attached for changes in current functional level)  Adverse reaction to  treatment or activity: None              Re: Ada Rock   :   1955        OBJECTIVE  Changes noted in objective findings:  Yes,     Mild loss of R shoulder Flexion and ER PROM remains (cont capsular stretches)    R shoulder AROM  Flexion: 160 deg  Abduction: 160 deg  ER:  40 deg  IR: thumb to  T12    Painfree resisted isometric testing of the shoulder, tolerated progression to strengthening but emphasized need for continued shoulder ROM stretching     ASSESSMENT/PLAN  Updated problem list and treatment plan: Diagnosis 1:  R shoulder adhesive capsulitis    STG/LTGs have been met or progress has been made towards goals:  Yes (See Goal flow sheet completed today.)  Assessment of Progress: The patient's condition is improving.  Self Management Plans:  Patient has been instructed in a home treatment program.  I have re-evaluated this patient and find that the nature, scope, duration and intensity of the therapy is appropriate for the medical condition of the patient.  Ada continues to require the following intervention to meet STG and LTG's:  PT    Recommendations:  This patient is ready to be discharged from therapy and continue their home treatment program.    Please refer to the daily flowsheet for treatment today, total treatment time and time spent performing 1:1 timed codes.        Thank you for your referral.    INQUIRIES  Therapist: Gerry Suero DPT   [facility] PHYSICAL THERAPY  [address] [place] [address]  Phone: [phone]  Fax: [phone]

## 2019-12-31 PROBLEM — M75.01 ADHESIVE CAPSULITIS OF RIGHT SHOULDER: Status: RESOLVED | Noted: 2019-03-04 | Resolved: 2019-12-31

## 2019-12-31 NOTE — PROGRESS NOTES
PROGRESS  REPORT    Progress reporting period is from 11/14/19 to 12/27/19. Pt has attended 4 PT sessions addressing patient's exacerbation of her R shoulder pain associated with adhesive capsulitis. Pt previously attended PT sessions (02/2019 to 03/2019) addressing patient's chief complaints of R shoulder adhesive capsulitis with reported onset in 10/2018 with correlation to overuse with biking and lifting. Pt reported significant improvement in pain and ROM with a primary treatment emphasis on a progressive shoulder ROM program with capsular stretching and a home strengthening program.     However, pt reported poor adherence to her HEP and had a flare up of her shoulder pain in 10/2019. Pt had a f/u with MD who recommended PT for progressive shoulder ROM and eventual scapular stabilization. MRI revealed moderate RTC tendinosis w/o tearing. Pt received another CSI in 11/2019 and reported considerable improvement in pain, and tolerated a progressive shoulder ROM/stretching program with eventual progression to capsular stretching and a home RTC/scapular strengthening program. Pt to continue HEP independently and f/u as needed    SUBJECTIVE  Subjective: Pt reports doin very well overall and has been pleased with her progress. Still has some R shoulder soreness with reaching overhead, but much improved    Current Pain level: 1/10.     Initial Pain level: 8/10.   Changes in function:  Yes (See Goal flowsheet attached for changes in current functional level)  Adverse reaction to treatment or activity: None    OBJECTIVE  Changes noted in objective findings:  Yes,     Mild loss of R shoulder Flexion and ER PROM remains (cont capsular stretches)    R shoulder AROM  Flexion: 160 deg  Abduction: 160 deg  ER: 40 deg  IR: thumb to T12      Painfree resisted isometric testing of the shoulder, tolerated progression to strengthening but emphasized need for continued shoulder ROM stretching     ASSESSMENT/PLAN  Updated problem list  and treatment plan: Diagnosis 1:  R shoulder adhesive capsulitis    STG/LTGs have been met or progress has been made towards goals:  Yes (See Goal flow sheet completed today.)  Assessment of Progress: The patient's condition is improving.  Self Management Plans:  Patient has been instructed in a home treatment program.  I have re-evaluated this patient and find that the nature, scope, duration and intensity of the therapy is appropriate for the medical condition of the patient.  Ada continues to require the following intervention to meet STG and LTG's:  PT    Recommendations:  This patient is ready to be discharged from therapy and continue their home treatment program.    Please refer to the daily flowsheet for treatment today, total treatment time and time spent performing 1:1 timed codes.

## 2022-02-18 ENCOUNTER — OFFICE VISIT (OUTPATIENT)
Dept: URGENT CARE | Facility: URGENT CARE | Age: 67
End: 2022-02-18
Payer: COMMERCIAL

## 2022-02-18 VITALS
OXYGEN SATURATION: 98 % | RESPIRATION RATE: 16 BRPM | HEIGHT: 64 IN | TEMPERATURE: 98 F | HEART RATE: 56 BPM | WEIGHT: 116 LBS | BODY MASS INDEX: 19.81 KG/M2 | SYSTOLIC BLOOD PRESSURE: 123 MMHG | DIASTOLIC BLOOD PRESSURE: 74 MMHG

## 2022-02-18 DIAGNOSIS — R30.0 DYSURIA: Primary | ICD-10-CM

## 2022-02-18 LAB
ALBUMIN UR-MCNC: NEGATIVE MG/DL
APPEARANCE UR: CLEAR
BACTERIA #/AREA URNS HPF: ABNORMAL /HPF
BILIRUB UR QL STRIP: NEGATIVE
COLOR UR AUTO: YELLOW
GLUCOSE UR STRIP-MCNC: NEGATIVE MG/DL
HGB UR QL STRIP: ABNORMAL
KETONES UR STRIP-MCNC: NEGATIVE MG/DL
LEUKOCYTE ESTERASE UR QL STRIP: NEGATIVE
NITRATE UR QL: NEGATIVE
PH UR STRIP: 5 [PH] (ref 5–7)
RBC #/AREA URNS AUTO: ABNORMAL /HPF
SP GR UR STRIP: 1.02 (ref 1–1.03)
SQUAMOUS #/AREA URNS AUTO: ABNORMAL /LPF
UROBILINOGEN UR STRIP-ACNC: 0.2 E.U./DL
WBC #/AREA URNS AUTO: ABNORMAL /HPF

## 2022-02-18 PROCEDURE — 99203 OFFICE O/P NEW LOW 30 MIN: CPT | Performed by: PHYSICIAN ASSISTANT

## 2022-02-18 PROCEDURE — 81001 URINALYSIS AUTO W/SCOPE: CPT | Performed by: PHYSICIAN ASSISTANT

## 2022-02-18 NOTE — PROGRESS NOTES
Dysuria  - UA macro with reflex to Microscopic and Culture - Clinc Collect  - Urine Microscopic    Follow up with PCP in 2 weeks if symptoms persist.      MARJAN Landa [facility] URGENT [facility]    Subjective   66 year old who presents to clinic today for the following health issues:    Urgent Care and UTI       HPI     Genitourinary - Female  Onset/Duration: 2 weeks  Description:   Painful urination (Dysuria): no           Frequency: YES  Blood in urine (Hematuria): no  Delay in urine (Hesitency): YES  Intensity: mild  Progression of Symptoms:  same  Accompanying Signs & Symptoms:  Fever/chills: no  Flank pain: no  Nausea and vomiting: no  Vaginal symptoms: none  Abdominal/Pelvic Pain: YES  History:   History of frequent UTI s: no  History of kidney stones: no  Precipitating or alleviating factors: None  Therapies tried and outcome:  None     Review of Systems   Review of Systems   See HPI     Objective    Temp: 98  F (36.7  C) Temp src: Temporal BP: 123/74 Pulse: 56   Resp: 16 SpO2: 98 %       Physical Exam   Physical Exam  Constitutional:       General: She is not in acute distress.     Appearance: Normal appearance. She is normal weight. She is not ill-appearing, toxic-appearing or diaphoretic.   HENT:      Head: Normocephalic and atraumatic.   Cardiovascular:      Rate and Rhythm: Normal rate.      Pulses: Normal pulses.   Pulmonary:      Effort: Pulmonary effort is normal. No respiratory distress.      Breath sounds: Normal breath sounds.   Abdominal:      Tenderness: There is no right CVA tenderness or left CVA tenderness.   Neurological:      General: No focal deficit present.      Mental Status: She is alert and oriented to person, place, and time. Mental status is at baseline.      Gait: Gait normal.   Psychiatric:         Mood and Affect: Mood normal.         Behavior: Behavior normal.         Thought Content: Thought content normal.         Judgment: Judgment normal.          No results  found for this or any previous visit (from the past 24 hour(s)).

## 2023-01-17 ENCOUNTER — OFFICE VISIT (OUTPATIENT)
Dept: VASCULAR SURGERY | Facility: CLINIC | Age: 68
End: 2023-01-17
Payer: MEDICARE

## 2023-01-17 ENCOUNTER — TELEPHONE (OUTPATIENT)
Dept: VASCULAR SURGERY | Facility: CLINIC | Age: 68
End: 2023-01-17

## 2023-01-17 DIAGNOSIS — I78.1 SPIDER VEINS: ICD-10-CM

## 2023-01-17 DIAGNOSIS — I83.90 ASYMPTOMATIC VARICOSE VEINS: Primary | ICD-10-CM

## 2023-01-17 PROCEDURE — 99203 OFFICE O/P NEW LOW 30 MIN: CPT | Performed by: SURGERY

## 2023-01-17 NOTE — TELEPHONE ENCOUNTER
Compression Hose Order  Pt would like 1 pair(s) of beige, open-toe, thigh high, 20-30mmhg compression hose.    Please have patient's compression hose ready on back shelf in [place] for patient to  in clinic.    Patient has procedure on 4/11/23 with Dr. Crawley.    Sherice Benavides  [facility]  [facility]

## 2023-01-17 NOTE — TELEPHONE ENCOUNTER
Pt's compression hose ready on back shelf in [place].    Connie Saleem RN  [facility]  [facility]

## 2023-01-17 NOTE — NURSING NOTE
Patient Reported symptoms:    Bilateral leg   Heaviness None of the time   Achiness None of the time   Swelling None of the time   Throbbing None of the time   Itching None of the time   Appearance Not at all noticeable   Impact on work/activities Symptoms but full able to participate

## 2023-01-17 NOTE — PATIENT INSTRUCTIONS
Pre-Procedure Instructions:         Phlebectomies  You are having Phlebectomies, where one or more of your veins are removed.    Insurance  If your procedure was deemed medically necessary, we will call your insurance company to verify benefits.    Your Current Medications and Allergies  To reduce bruising, please do not take aspirin-type medications (Motrin, Aleve, Ibuprofen, Advil, etc.) for three days before your procedure. You may take Tylenol if you need a pain reliever.  Are you on blood thinner medications? (Plavix, Coumadin, Eliquis, Xarelto) Please discuss this with your surgeon. You may resume taking your blood thinner medication after your procedure.  Are you sensitive to latex or adhesives used for fake fingernails? Please let us know!    Driving Escort and   Please arrange to have a trusted adult (18 years old or older) drive you to and from the clinic.  For your safety, we recommend you have a trusted adult stay with you until the next morning.    Your Health  If you have a change in your health before the procedure, contact our office immediately.    (For example: cold symptoms, cough, urinary tract infection, fever, flu symptoms).  A pre-procedure physical is not required.     Note  It is sometimes necessary to adjust the procedure schedule due to emergencies. We greatly appreciate your flexibility and understanding in this matter.  ___________________________________________________________________________________    Check List: The Morning of Your Procedure  ___1. Please do not put anything on your leg(s) or shave the day of your procedure.  ___2. You may take your normal medications the day of your procedure.  ___3. It is recommended you eat a light breakfast or lunch the day of your procedure.  ___4. Wear comfortable loose-fitting clothing and wide-fitting shoes (i.e. tennis shoes, slip-ons).  ___5. Please arrive at our clinic at the specified time given by the nurse.  ___6. You will  sign an affirmation of informed consent.  ___7. Bring your pre-procedure sedation medication (lorazepam and clonidine) with you to the clinic. One hour              before your procedure, you will be instructed to take these medications. The lorazepam (Ativan) lowers              anxiety and sedates you; the clonidine makes the lorazepam more effective. Everyone's body processes              these medications differently. Therefore, reactions to these medications vary. Some people stay awake and              some people sleep through the whole procedure. You may not remember everything about the procedure              or the day. You do not want to make any big decisions for the rest of the day.     The Day of Your Procedure:         Phlebectomies  In the Exam Room  A nurse will bring you back to an exam room with your family member or friend. This is when your informed consent will be signed, and you will take your pre-procedure medications.  You will be asked to remove everything from the waist down, including undergarments. You will then put on a hospital gown or shorts and blue booties.  Your surgeon will come in to answer any questions and mirta any bulging varicose veins to be removed.  You will be taken to the restroom to empty your bladder before going into the procedure room.    In the Procedure Room  You will be escorted to the procedure room. You will lie on a procedure table covered with a sheet or blanket.  A nurse will put a blood pressure cuff on your arm and a pulse/oxygen monitor on a finger. Your vital signs will be monitored every 15 minutes.  Your gown will be pulled up slightly and the groin exposed for a short period of time. The surgeon's assistant will clean your foot, leg, and groin with an antibacterial solution. We will get you covered up as quickly as possible!  Sterile towels and drapes will be used to cover you and the table. You will be asked to keep your hands under the drapes during  the procedure.  The lights will be turned down. The table will be tipped so your feet are higher than your head. You may feel like you're going to slide off, but you won't.    The Procedure  Medication will be injected to numb your leg. You will feel some needle sticks and may feel discomfort as the medication goes in.   Once this is done, you should not experience significant discomfort. But if you do, please let us know and more numbing medication can be injected.  Small incisions are made where the bulging varicose veins have been marked on your leg(s) and these veins will be removed using a small marixa hook instrument.      Post-Procedure  Once the procedure is done, your leg(s) will be washed with warm water and dried. Your leg(s) will be bandaged with large soft dressings and a large ACE bandage wrapped from toes to groin.   You will be offered something to drink and a light snack.  You will rest with your leg(s) elevated for approximately 30 minutes. Your friend or family member may join you.  For your safety, you will be taken to your car in a wheelchair. If you are able to, it is good to keep your leg(s) elevated on the car ride home.    Post-Procedure Instructions:         Phlebectomies    Post-Op Day Zero - The Day of Your Procedure:  1. Medication for Pain Control and Inflammation Control   - The numbing medication injected during your procedure will last for several hours. The pre-procedure                 tablets may make you very sleepy and you might not remember everything from the procedure or from                 the day. This will usually wear off by the next day.   - Ibuprofen: If tolerated, take ibuprofen (e.g., Advil) to reduce inflammation whether or not you have                 pain. For three days, take two tablets (200mg each) with every meal and at bedtime with a snack. If                 your pain is not controlled with ibuprofen, you may take prescription pain medication (such as  Norco),                 if prescribed.   - You may resume taking any medications you were taking before your procedure.  2. Activity   - Rest with your leg(s) elevated above your heart. This will prevent swelling and bleeding.                 You do not need to elevate your leg(s) while sleeping at night. You may go upstairs, sit up to                 eat, use the bathroom, and take several five-minute walks. Otherwise, keep your leg(s) elevated.                 Minimize the amount of time you are up on your feet to about 30 minutes at a time.  3. Bandages   - The incision sites will be covered with soft bandages and an ACE wrap. Keep your bandages on and      dry for 48 hours. The ACE should provide  snug  compression but should not cause pain or numbness      in the toes. If you have significant discomfort or your toes become cold or numb, unwrap your ACE and      rewrap with less tension starting at the toes wrapping upward.  4. Incisions   - Bleeding: You may see some incision sites that are oozing through the bandages. This is not unusual      and can be managed with Rest, Ice, Compression and Elevation (RICE). Apply ice and firm pressure      directly to the site that is bleeding and rest with your leg(s) elevated above your heart for 20-30 minutes.    Post-Op Day One:  1. Medication   - Ibuprofen: Continue the same as the Day of Your Procedure. If your pain is not controlled with      ibuprofen, you may take prescription pain medication (such as Norco), if prescribed.  2. Activity   - We would like you to get up at least six times and walk around for short periods of time, unless it is      causing you pain. You should not be on your feet more than 90 minutes at a time. Elevate your leg      above your heart when you are not walking.  3. Bandages   - Your bandages must be kept on and dry for 48 hours.  4. Driving   - You may resume driving when you can do so safely. Do not drive if you are taking narcotic  pain      medication.    Post-Op Day Two:   1. Medication  - Ibuprofen: Continue the same as the Day of Your Procedure.  2.  Activity   - Walk as tolerated. Elevate as much as possible when not walking.  3. Bandages  - You may remove ACE wrap and padding, unless otherwise instructed by your physician. If your return appointment is before 48 hours, we will take the bandages off for you at the clinic. You may shower like normal after your bandages are removed. To aid in the healing, your physician may recommend wearing compression hose for at least one to two weeks following your phlebectomies.  4. Incisions   - Your leg(s) will be bruised; there may be swelling, hard knots under the skin and possibly some    numbness. These will likely resolve over time. If you see  hair-like  strings coming out of your    incisions, do not pull them (this will only cause pain/discomfort). We will trim them when you come   back for your follow-up appointment.  5. Call Us If:   - You see any areas on your leg that are red and angry in appearance.   - You notice any drainage that is milky or cloudy in appearance or has a foul odor.   - You run a temperature of 100.5 or greater.    Post-Op Day Three:  You will have a follow up appointment 2-4 days post-procedure. At this appointment, we will check your incisions and see how you are doing.   __________________________________________________________________________________________    The Two Weeks Following Your Procedure  1.  Skin Care   - Do not use any lotions, creams or powders on your incisions for 14 days or until the incisions have               healed.   - Do not soak in a bathtub, hot tub or go swimming for 14 days or until your incisions have healed.  2.  Medications   - You may use ibuprofen or acetaminophen (e.g., Tylenol) as needed for pain or discomfort.  3.  Activity   - Do not lift over 25 pounds. After about ten days you may resume exercise such as aerobics, running,     tennis or weightlifting. Use your common sense and ease back into your exercise routine slowly.  4.  Travel   - Do not fly in an airplane for 14 days after your procedure.  If you have a long car trip planned within    two to three weeks following your procedure, stop and walk for a few minutes every two hours.    Periodic ankle pumps during the ride may be helpful.    Six Week Appointment  - At your six-week appointment, you will see your surgeon for an exam and evaluation. This office visit               will be scheduled when you return for post-op day three return appointment.    Return to Work  1.  If you work outside the home, you may return to work in a few days depending on the extent of your        procedure, how you tolerate it, and the type of work you perform.  2.  Paperwork: If your employer requires paperwork or you would like a letter written to your employer, please        let us know. We will complete disability type forms at no charge. Please allow five business days for forms        to be completed.        Coridon last reviewed this educational content on 12/1/2019 2000-2021 [employer]. All rights reserved. This information is not intended as a substitute for professional medical care. Always follow your healthcare professional's instructions.         Sclerotherapy: Pre-Treatment Instructions    Recommended Sessions:  1-2 treatment sessions    Pricing: Full session - $407                 *Payment is due at the time of visit following the treatment    Time Required per Treatment Session - About 45 minutes  Please come in 15 minutes before your scheduled appointment.  30 min.  Sclerotherapy treatments last approximately 30 minutes.  5 min.    A staff member will wipe your legs off with warm water and dry them with a wash cloth.                 Then you can put your compression hose on, get dressed and check out.  10 min.  After your treatment, you will be asked to walk around  for 10 minutes before you get in your car.    Medications  Five days before your appointment, discontinue aspirin (Bufferin, Anacin, etc.) and Ibuprofen (Motrin, Advil, Aleve, etc.) to reduce bruising. Resume these medications the day following the treatment.    Leg Preparation  Do not shave your legs or apply any oil, lotion or powder the night before or the day of your treatment.    Clothing  Shorts:  Bring a pair of loose, comfortable shorts to wear during your treatment (or you can choose to wear ours). Shoes: Bring comfortable shoes to accommodate the compression hose after your treatment. Do not wear flip-flops or thong-style sandals unless you have open-toe compression hose.    Photographs  Photos will be taken before each treatment. This helps monitor your progress.    Injections  The physician will inject your veins with the sclerotherapy solution chosen to meet your specific needs.    Compression Hose  Please bring your compression hose if you have them. They may also be reserved for you at our clinic. Compression hose must be worn immediately after each sclerotherapy treatment.  The hose must be compression level 20-30, and they must be worn for 24 hours straight after your treatment.  If you have never worn compression hose before, a staff member will teach you how to put them on.             You cannot have a treatment without compression hose.               They are critical to the success of your treatment!    You may purchase your compression hose from us. We will measure you and have the hose available when you come for your treatment.    Cancellation and Rescheduling  If you need to cancel or reschedule your sclerotherapy treatment, please give our office at least 24 hour notice.    Sclerotherapy: Basic Information        What is sclerotherapy?  Sclerotherapy is a treatment for  spider  veins.  Spider  veins are small veins just under your skin that can look red, blue or purple. Most  spider  veins  are only a cosmetic problem.  Spider  veins are not useful and treating them will not affect your circulation.    How does sclerotherapy work?  1.  Injections: A very small needle is used to inject a solution into the  spider  veins. The solution irritates the cells that line the vein walls. This causes the veins to collapse. The vein walls to stick together and they can no longer carry blood. Different solutions are used based on the size of the veins.  2.  Compression:  The spider veins are kept collapsed by wearing compression stockings. Your body will break down and absorb the treated veins. You wear the compression hose for 24 hours after the treatment and then for 4 more days during your waking hours only.    How does the body heal after sclerotherapy?  The process is similar to how your body heals after a bad bruise. It takes 4-6 weeks or more for the healing to be complete. When the healing is complete, the vein is no longer visible. It may take more than one treatment.    How do I get the best results?  It is important to follow the post-sclerotherapy instructions. The best results require time and patience. The injection sites will continue to heal and fade for months after a treatment. Please discuss your expectations with your doctor to keep them realistic. Your doctor will do everything possible to meet or exceed your expectations.    How many treatments are needed?  After your initial exam, your doctor will give you an estimate of the number of treatments that may be required. It depends upon the size, type, and quantity of your  spider  veins and on the doctor's assessment, your history and expectations. You may end up needing fewer or more treatments.    How soon can I have another treatment?  Additional treatments are scheduled every 4-6 weeks to allow time for the body to respond to the previous treatment.    Common Side Effects:  Itching  The areas that were injected may itch. This is usually mild  and lasts less than a day. Do not use lotions or creams on your legs until the injection sites have healed over.    Pain  It is common to have some tenderness at the injection sites. Injection of the solution can be uncomfortable, but is usually well tolerated by most patients. The tenderness is temporary, lasting 24 hours at most. Tylenol or Ibuprofen can be used, if needed, following the product directions.    Bruising  This may occur at the injections sites. Bruising may be minimized by avoiding Aspirin and Ibuprofen products for five days before each treatment session.    Hyperpigmentation  A light brown discoloration of the skin may develop along the veins in the areas injected. Approximately 20-30% of patients treated note the discoloration (which is lighter and less obvious than the veins that are being treated). The hyperpigmentation usually fades in a couple of weeks, but may take several months to a year to totally resolve. There is a 1% chance of hyperpigmentation continuing after one year.    Trapped blood  A small amount of blood may become trapped and hardened in the veins. This may feel like a knot or cord and it may look dark blue or bruised. This is a common occurrence. You may need to return before your next treatment so this area can be drained to remove the trapped blood. This will reduce the hyperpigmentation that can occur. The chance of this occurring can be decreased with proper use of compression hose after your treatment.    Matting  Matting is the formation of new, fine  spider  veins in the area injected.  It occurs in approximately 10% of patients injected. The exact reason for this is unknown. If untreated, the matting usually resolves in 3 to 12 months, but very rarely, it can be permanent. If the matting does not fade, it can be re-injected.    Rare Side Effects:  Ulceration at injection sites  Very rarely, a small ulcer will occur at the site where a vein is injected. An ulcer can  take 4 to 6 weeks to completely heal. A small scar may result.    Allergic reactions  There is a very rare incidence of an allergic reaction to the solution injected. You will be observed for such reaction and will be treated appropriately should it occur. Please inform us of any allergy history.    Pulmonary embolus/Deep vein thrombosis  This is a blood clot which moves to the lungs/a blood clot in the deep vein system. There is an extraordinarily low incidence of this complication.      SCLEROTHERAPY AFTER CARE  Immediately:  After treatment, walk for 10-15 minutes before getting in your car.  If your trip home is more than 1 hour, stop and walk around for 5-10 minutes. Avoid sitting or standing for extended periods.   First 24 hours: Wear your compression continuously, even while in bed. After the 24 hours, you may shower if you want to. Put your hose back on, unless you are going to bed. You should NOT wear compression to bed after the first 24 hours. You may fly the next day, but wear your compression.   For 5 days: Wear the compression hose for waking hours only. You may continue to wear them longer than 5 days if you prefer.   For days 5-7: Walking is encouraged, as it promotes efficient circulation in your veins. You may do activities that raise your heart rate, but do NOT run, jog, do high impact aerobics, or weight lifting. After 7 days, no activity restrictions.  Shaving: Wait a few days to shave or apply lotion.   Bathing: Do NOT take hot baths or sit in a hot tub for 7-10 days.    For 1 year: Wear SPF 30 sunscreen on your legs when in the sun. This is very important! It helps prevent darkening of the skin at the injection sites.   Medications: You may resume your usual medications, including aspirin or ibuprofen.    Common Things to Expect       Compression must be worn for the first 24 hours and then during the day for 5-7 days.    If larger veins are treated with ultrasound-guided sclerotherapy, you  will have redness, firmness, tenderness, and swelling.  This firmness and tenderness may take 3-6 months to resolve. Ibuprofen and compression hose will aid in this process.    You will have bruising that can last up to 3 weeks. Most fading of the veins will occur between 3 and 6 weeks after treatment.    You may notice brown discoloration (hyperpigmentation) at the treatment site.  This should fade with time, but will take 3 months to 1 year to fully heal.     Some treated veins may look darker because of trapped blood within the vein. This trapped blood can be removed at a minimum of 1 month following treatment. Larger veins are more likely to develop trapped blood.    It is very important for you to use at least SPF 30 sunscreen in order to help prevent the discoloration of your skin.    Migraines rarely occur following sclerotherapy, but are more likely in patients with a history of migraines.  Treat as you would any other migraine.      Melony last reviewed this educational content on 11/1/2019 2000-2021 [employer]. All rights reserved. This information is not intended as a substitute for professional medical care. Always follow your healthcare professional's instructions.

## 2023-01-17 NOTE — LETTER
1/17/2023         RE: Ada Rock  [address] [place] [address]        Dear Colleague,    Thank you for referring your patient, Ada Rock, to the [facility] [place]. Please see a copy of my visit note below.    VEINSOLUTIONS CONSULTATION    HPI:    Ada Rock is a pleasant 67 year old female who presents with  asymptomatic left lower extremity varicose varicose veins and bilateral reticular veins and telangiectasias.  Small veins with and telangiectasias have been present on both legs for more than 20 years but, bulging varicose veins have appeared on the left leg within the last 3 years.  She had sclerotherapy with a dermatologist some 20 years ago.  She has had no superficial phlebitis, deep vein thrombosis or hemorrhage.  She has no significant swelling.  Her primary concern is improvement from a cosmetic standpoint.    Her family history is significant for varicose veins in her mother.  There is no family history of venous thromboembolism.    Patient does not wear compression hose on a regular basis but did wear them during her pregnancies.    PAST MEDICAL HISTORY: No past medical history on file.    PAST SURGICAL HISTORY: No past surgical history on file.    FAMILY HISTORY: No family history on file.    SOCIAL HISTORY:   Social History     Tobacco Use     Smoking status: Never     Smokeless tobacco: Never   Substance Use Topics     Alcohol use: Not on file       REVIEW OF SYSTEMS: Review Of Systems  Skin: negative  Eyes: negative  Ears/Nose/Throat: negative  Respiratory: No shortness of breath, dyspnea on exertion, cough, or hemoptysis  Cardiovascular: negative  Gastrointestinal: negative  Genitourinary: negative  Musculoskeletal: negative  Neurologic: negative  Psychiatric: negative  Hematologic/Lymphatic/Immunologic: Easy bruising  Endocrine: negative      Vital signs:  There were no vitals taken for this visit.    No current outpatient medications on  file.       PHYSICAL EXAM:  General: Pleasant, NAD.   HEENT: Normocephalic, atraumatic, external ears and nose normal.   Respiratory: Normal respiratory effort.   Cardiovascular: Pulse is regular.   Musculoskeletal: Gait and station normal.  The joints of her fingers and toes without deformity.  There is no cyanosis of her nailbeds.   EXTREMITIES: Right lower extremity: Small veins present on the right mid posteromedial calf.  Scant telangiectasias on the medial right thigh.  Scar distal medial right thigh.  No stasis changes or edema.    Left lower extremity: 4 mm varicosities about the left medial calf.  A few scattered telangiectasias.  No edema or stasis changes.  PULSES: R/L (3=normal pulse, 0=no palpable pulse) dorsalis pedis: 3/3; posterior tibial: 3/3.      Neurologic: Grossly normal  Psychiatric: Mood, affect, judgment and insight are normal     ASSESSMENT:  Asymptomatic left lower extremity varicose veins with bilateral telangiectasias and small right posterior calf veins.  We discussed the lower extremity vein anatomy, the pathophysiology of venous insufficiency and the low risk from these veins.  Conservative treatment with regular use of compression hose, exercise, dietary measures and leg elevation could help slow progression of the disease process.    She wishes to have this treated for cosmetic purposes.  I would therefore recommend left lower extremity phlebectomies and bilateral extremity cosmetic sclerotherapy.  I will perform ultrasound-guided sclerotherapy of the right posterior calf varicose veins at the same setting.  Risks of the procedure including bleeding, infection, nerve injury, deep vein thrombosis, allergic reaction, hyperpigmentation and ulceration were discussed.    PLAN:  Left lower extremity cosmetic phlebectomies with bilateral extremity cosmetic sclerotherapy, right posterior calf sclerotherapy under ultrasound guidance.  She understands fully that this will not be covered by  insurance and will be her responsibility.      Dav Crawley MD    Dictated using Dragon voice recognition software which may result in transcription errors          VEIN CLINIC LEG DRAWING:                  Again, thank you for allowing me to participate in the care of your patient.        Sincerely,        Dav Crawley MD

## 2023-01-23 NOTE — PROGRESS NOTES
VEINSOLUTIONS CONSULTATION    HPI:    Ada Rock is a pleasant 67 year old female who presents with  asymptomatic left lower extremity varicose varicose veins and bilateral reticular veins and telangiectasias.  Small veins with and telangiectasias have been present on both legs for more than 20 years but, bulging varicose veins have appeared on the left leg within the last 3 years.  She had sclerotherapy with a dermatologist some 20 years ago.  She has had no superficial phlebitis, deep vein thrombosis or hemorrhage.  She has no significant swelling.  Her primary concern is improvement from a cosmetic standpoint.    Her family history is significant for varicose veins in her mother.  There is no family history of venous thromboembolism.    Patient does not wear compression hose on a regular basis but did wear them during her pregnancies.    PAST MEDICAL HISTORY: No past medical history on file.    PAST SURGICAL HISTORY: No past surgical history on file.    FAMILY HISTORY: No family history on file.    SOCIAL HISTORY:   Social History     Tobacco Use     Smoking status: Never     Smokeless tobacco: Never   Substance Use Topics     Alcohol use: Not on file       REVIEW OF SYSTEMS: Review Of Systems  Skin: negative  Eyes: negative  Ears/Nose/Throat: negative  Respiratory: No shortness of breath, dyspnea on exertion, cough, or hemoptysis  Cardiovascular: negative  Gastrointestinal: negative  Genitourinary: negative  Musculoskeletal: negative  Neurologic: negative  Psychiatric: negative  Hematologic/Lymphatic/Immunologic: Easy bruising  Endocrine: negative      Vital signs:  There were no vitals taken for this visit.    No current outpatient medications on file.       PHYSICAL EXAM:  General: Pleasant, NAD.   HEENT: Normocephalic, atraumatic, external ears and nose normal.   Respiratory: Normal respiratory effort.   Cardiovascular: Pulse is regular.   Musculoskeletal: Gait and station normal.  The joints of her  fingers and toes without deformity.  There is no cyanosis of her nailbeds.   EXTREMITIES: Right lower extremity: Small veins present on the right mid posteromedial calf.  Scant telangiectasias on the medial right thigh.  Scar distal medial right thigh.  No stasis changes or edema.    Left lower extremity: 4 mm varicosities about the left medial calf.  A few scattered telangiectasias.  No edema or stasis changes.  PULSES: R/L (3=normal pulse, 0=no palpable pulse) dorsalis pedis: 3/3; posterior tibial: 3/3.      Neurologic: Grossly normal  Psychiatric: Mood, affect, judgment and insight are normal     ASSESSMENT:  Asymptomatic left lower extremity varicose veins with bilateral telangiectasias and small right posterior calf veins.  We discussed the lower extremity vein anatomy, the pathophysiology of venous insufficiency and the low risk from these veins.  Conservative treatment with regular use of compression hose, exercise, dietary measures and leg elevation could help slow progression of the disease process.    She wishes to have this treated for cosmetic purposes.  I would therefore recommend left lower extremity phlebectomies and bilateral extremity cosmetic sclerotherapy.  I will perform ultrasound-guided sclerotherapy of the right posterior calf varicose veins at the same setting.  Risks of the procedure including bleeding, infection, nerve injury, deep vein thrombosis, allergic reaction, hyperpigmentation and ulceration were discussed.    PLAN:  Left lower extremity cosmetic phlebectomies with bilateral extremity cosmetic sclerotherapy, right posterior calf sclerotherapy under ultrasound guidance.  She understands fully that this will not be covered by insurance and will be her responsibility.      Dav Crawley MD    Dictated using Dragon voice recognition software which may result in transcription errors          VEIN CLINIC LEG DRAWING:

## 2023-03-07 ENCOUNTER — TELEPHONE (OUTPATIENT)
Dept: VASCULAR SURGERY | Facility: CLINIC | Age: 68
End: 2023-03-07
Payer: MEDICARE

## 2023-03-07 NOTE — TELEPHONE ENCOUNTER
3/7/2023    Vein Clinic Preoperative Nurse Call    Procedure:  Left leg 0.5 unit phlebs ($),right leg u/s sclero and ($) sclero ($) on 3/14/23 w/ CPN   Date: 3/14/2023  Surgeon: Dr. Crawley  Time: 1300  Check in time: 1200    Called patient and left a detailed message. Informed patient: when to check in (1200) to sign consent, to bring their preop medications in their original bottle with them (1mg ativan, 0.1mg clonidine).ONLY if patient is electing to take sedation medications. Patient will take the medications after signing the consent to the procedure. Instructed patient to wear a mask, wear loose-fitting comfortable clothing, and bring their compression hose. Ensured patient has a /someone that will be responsible for them the rest of the day. Visitors are allowed in the clinic, but they would need to stay in an exam room or wait in the parking lot or leave. If  does leave, IF POSSIBLE, we ask that they do not go more than 15-20 mins from our clinic. Once procedure is completed, we will keep patient in recovery for 30-45 mins, and call  with aftercare instructions. Informed patient, that if possible, they should sit in the backseat to elevate their leg on the ride home.    Pt needs Thigh High compression hose for procedure. Status of the hose: patient purchase from clinic day of procedure    Special instructions:  Requested patient to call back to collect payment and to notify staff if she wants sedation medication for procedure and to answer preop questions.     Special COVID-19 instructions: Patient aware they need to wear a mask to our clinic and during their procedure. Patient aware their  can come in with them, but would need to stay in an exam room during the procedure or wait in the parking lot or leave. Nurse will call patient's  when procedure is over.    Patient understands if they have any of the following symptoms (fever, cough, shortness of breath, rash), they need  to notify us immediately to cancel their procedure and will have to reschedule for a later date.    Gave patient our call back number if any further questions or concerns.    Arjun Garrido RN  [facility]

## 2023-03-10 NOTE — TELEPHONE ENCOUNTER
Pt called back. Reviewed all preop and postop information with pt. Pt declining taking sedation medication so she knows to check in at 12:30 then and informed pt she could drive herself if she would like as the left leg will have a bulky bandage and the right leg will have her thigh high compression stocking.    Pt in agreement with procedure plan and had no further questions.    Connie Saleem RN  [facility]  Vein Clinic

## 2023-03-13 NOTE — PATIENT INSTRUCTIONS
Post-Procedure Instructions:                               Phlebectomies      Post-Op Day Zero - The Day of Your Procedure:  1. Medication for Pain Control and Inflammation Control   - The numbing medication injected during your procedure will last for several hours. The pre-procedure                 tablets may make you very sleepy and you might not remember everything from the procedure or from                 the day. This will usually wear off by the next day.   - Ibuprofen: If tolerated, take ibuprofen (e.g., Advil) to reduce inflammation whether or not you have                 pain. For three days, take two tablets (200mg each) with every meal and at bedtime with a snack. If                 your pain is not controlled with ibuprofen, you may take prescription pain medication (such as Norco),                 if prescribed.   - You may resume taking any medications you were taking before your procedure.  2. Activity   - Rest with your leg(s) elevated above your heart. This will prevent from a lot of swelling and                 bleeding. You do not need to elevate your leg(s) while sleeping at night. You may go upstairs, sit up to                 eat, use the bathroom, and take several five minute walks. Otherwise, keep your leg(s) elevated.                 Minimize the amount of time you are up on your feet to about 30 minutes at a time.  3. Bandages   - The incision sites will be covered with soft bandages and an ACE wrap. Keep your bandages on and    dry for 48 hours. The ACE should provide  snug  compression, but should not cause pain or numbness    in the toes. If you have significant discomfort or your toes become cold or numb, unwrap your ACE and    rewrap with less tension starting at the toes wrapping upward.  4. Incisions   - Bleeding: You may see some incision sites that are oozing through the bandages. This is not unusual    and can be managed with Rest, Ice, Compression and Elevation (RICE). Apply  ice and firm pressure    directly to the site that is bleeding and rest with your leg(s) elevated above your heart for 20-30 minutes.    Post-Op Day One:  1. Medication   - Ibuprofen: Continue the same as the Day of Your Procedure. If your pain is not controlled with    ibuprofen, you may take prescription pain medication (such as Norco), if prescribed.  2. Activity   - We would like you to get up at least six times and walk around for short periods of time, unless it is    causing you pain. You should not be on your feet more than 90 minutes at a time. Elevate your leg    above your heart when you are not walking.  3. Bandages   - Your bandages must be kept on and dry for 48 hours.  4. Driving   - You may resume driving when you can do so safely. Do not drive if you are taking narcotic pain    medication.    Post-Op Day Two:   1. Medication  - Ibuprofen: Continue the same as the Day of Your Procedure.  2.  Activity   - Walk as tolerated. Elevate as much as possible when not walking.  3. Bandages  - Remove bandages and shower. If your return appointment is before 48 hours, we will take the bandages off for you at the clinic.  4. Incisions   - Your leg(s) will be bruised; there may be swelling, hard knots under the skin and possibly some    numbness. These will likely resolve over time. If you see  hair-like  strings coming out of your    incisions, do not pull them (this will only cause pain/discomfort). We will trim them when you come   back for your follow-up appointment.  5. Call Us If:   - You see any areas on your leg that are red and angry in appearance.   - You notice any drainage that is milky or cloudy in appearance or that has a foul odor.   - You run a temperature of 100.5 or greater.    Post-Op Day Three:  You will have a follow up appointment 2-4 days post-procedure. At this appointment, we will check your incisions and see how you are doing.     __________________________________________________________________________________________    The Two Weeks Following Your Procedure  1.  Skin Care   - Do not use any lotions, creams or powders on your leg for 14 days or until the incisions have healed.   - Do not soak in a bathtub, whirlpool, or hot tub or go swimming for 14 days or until your incisions have  healed.  2.  Medications   - You may use ibuprofen or acetaminophen (e.g., Tylenol) as needed for pain or discomfort.  3.  Activity   - Do not lift over 25 pounds. After about ten days you may resume exercise such as aerobics, running,    tennis or weight lifting. Use your common sense and ease back into your exercise routine slowly.  4.  Travel   - Do not fly in an airplane for 14 days after your procedure.  If you have a long car trip planned within    two to three weeks following your procedure, stop and walk for a few minutes every two hours.    Periodic ankle pumps during the ride may be helpful.    Six Week Appointment   At your six week appointment, you will see your surgeon for an exam and evaluation. This office visit    will be scheduled when you return for Post-op Day Three Return Appointment.     Return to Work  1.  If you work outside the home, you may return to work in a few days depending on the extent of your procedure, how you tolerate it, and the type of work you perform.  2.  Paperwork: If your employer requires paperwork or you would like a letter written to your employer, please    let us know. We will complete disability type forms at no charge. Please allow five business days for    forms to be completed.          SCLEROTHERAPY AFTER CARE  Immediately:  After treatment, walk for 10-15 minutes before getting in your car.  If your trip home is more than 1 hour, stop and walk around for 5-10 minutes. Avoid sitting or standing for extended periods.   First 24 hours: Wear your compression continuously, even while in bed. After the 24 hours,  you may shower if you want to. Put your hose back on, unless you are going to bed. You should NOT wear compression to bed after the first 24 hours. You may fly the next day, but wear your compression.   For 5 days: Wear the compression hose for waking hours only. You may continue to wear them longer than 5 days if you prefer.   For days 5-7: Walking is encouraged, as it promotes efficient circulation in your veins. You may do activities that raise your heart rate, but do NOT run, jog, do high impact aerobics, or weight lifting. After 7 days, no activity restrictions.    Shaving: Wait a few days to shave or apply lotion.   Bathing: Do NOT take hot baths or sit in a hot tub for 7-10 days.    For 1 year: Wear SPF 30 sunscreen on your legs when in the sun. This is very important! It helps prevent darkening of the skin at the injection sites.   Medications: You may resume your usual medications, including aspirin or ibuprofen.    Common Things to Expect  -  Compression must be worn for the first 24 hours and then during the day for 5-7 days.    -  If larger veins are treated with ultrasound-guided sclerotherapy, you will have redness, firmness, tenderness, and swelling.  This firmness and tenderness may take 3-6 months to resolve. Ibuprofen and compression hose will aid in this process.    -  You will have bruising that can last up to 3 weeks. Most fading of the veins will occur between 3 and 6 weeks after treatment.    -  You may notice brown discoloration (hyperpigmentation) at the treatment site.  This should fade with time, but will take 3 months to 1 year to fully heal.     -  Some treated veins may look darker because of trapped blood within the vein. This trapped blood can be removed at a minimum of 1 month following treatment. Larger veins are more likely to develop trapped blood.    -  It is very important for you to use at least SPF 30 sunscreen in order to help prevent the discoloration of your skin.    -   Migraines rarely occur following sclerotherapy, but are more likely in patients with a history of migraines.  Treat as you would any other migraine.

## 2023-03-14 ENCOUNTER — OFFICE VISIT (OUTPATIENT)
Dept: VASCULAR SURGERY | Facility: CLINIC | Age: 68
End: 2023-03-14
Payer: MEDICARE

## 2023-03-14 ENCOUNTER — ALLIED HEALTH/NURSE VISIT (OUTPATIENT)
Dept: VASCULAR SURGERY | Facility: CLINIC | Age: 68
End: 2023-03-14
Payer: MEDICARE

## 2023-03-14 VITALS — DIASTOLIC BLOOD PRESSURE: 81 MMHG | HEART RATE: 57 BPM | OXYGEN SATURATION: 97 % | SYSTOLIC BLOOD PRESSURE: 141 MMHG

## 2023-03-14 DIAGNOSIS — I83.90 ASYMPTOMATIC VARICOSE VEINS: Primary | ICD-10-CM

## 2023-03-14 DIAGNOSIS — I83.93 ASYMPTOMATIC VARICOSE VEINS OF BOTH LOWER EXTREMITIES: ICD-10-CM

## 2023-03-14 DIAGNOSIS — I78.1 SPIDER TELANGIECTASIS OF SKIN: ICD-10-CM

## 2023-03-14 DIAGNOSIS — I78.1 SPIDER VEINS: ICD-10-CM

## 2023-03-14 PROCEDURE — S9999 SALES TAX: HCPCS | Performed by: SURGERY

## 2023-03-14 PROCEDURE — 37799 UNLISTED PX VASCULAR SURGERY: CPT | Performed by: SURGERY

## 2023-03-14 PROCEDURE — 36468 NJX SCLRSNT SPIDER VEINS: CPT | Performed by: SURGERY

## 2023-03-14 PROCEDURE — 99207 PR NO CHARGE NURSE ONLY: CPT

## 2023-03-14 PROCEDURE — 37765 STAB PHLEB VEINS XTR 10-20: CPT | Mod: LT | Performed by: SURGERY

## 2023-03-14 PROCEDURE — A6533 GC STOCKING THIGHLNGTH 18-30: HCPCS

## 2023-03-14 NOTE — PROGRESS NOTES
Patient purchased one pair(s) of beige, thigh high, open-toe, size 1 compression hose from the clinic today.     Informed patient all compression hose purchases are final.    Sherice Moreno RN on 3/14/2023 at 2:23 PM

## 2023-03-14 NOTE — PROGRESS NOTES
Pre-procedure Nursing Note    Ada Rock presents to clinic for Vein Procedure    Pt drove herself today.    Prophylactic Medication:N/A   Sedation Medication: N/A  Compression Stockings: Pt purchased from clinic via telephone on 3/10/23.  The procedure is being performed on BLE.  Patient understanding of procedure matches consent? YES    Connie Saleem RN  [facility]  Vein Clinic

## 2023-03-14 NOTE — LETTER
3/14/2023         RE: Ada Rock  [address] [place] [address]        Dear Colleague,    Thank you for referring your patient, Ada Rock, to the [facility]IN [facility] [place]. Please see a copy of my visit note below.    Pre-procedure Nursing Note    Ada Rock presents to clinic for Vein Procedure    Pt drove herself today.    Prophylactic Medication:N/A   Sedation Medication: N/A  Compression Stockings: Pt purchased from clinic via telephone on 3/10/23.  The procedure is being performed on BLE.  Patient understanding of procedure matches consent? YES    Connie Saleem, WARREN  [facility]  Vein Clinic        Vein Clinic Procedure Note    Indications:  1.  Asymptomatic, bilateral lower extremity varicose veins of cosmetic concern  2.  Bilateral lower extremity spider telangiectasias of cosmetic concern    Procedure:  1. Ultrasound-guided, cosmetic sclerotherapy right posterior calf varicose veins  2. Bilateral lower extremity direct vision, cosmetic sclerotherapy  3. Cosmetic phlebectomies of left medial calf (less than 10 stabs)    Procedure Description  Details of procedure including risks of bleeding, infection, nerve injury, scarring, hyperpigmentation and deep vein thrombosis were discussed.  Patient voiced understanding and wished to proceed.  Informed consent was obtained.    I had the patient stand and marked varicosities on the left medial calf with an indelible marker.      Cosmetic sclerotherapy  I had the patient lie prone, imaged the posterior right calf and identified the varicosity that coursed between the right small saphenous vein and the great saphenous vein, the visible vein on the posterior calf.  Ultrasound guidance, directed a 27-gauge needle into the vein and injected 1% polidocanol foam sending the vein into tight spasm.  Only a single injection was needed.    I then donned the Syris headlight and injected spider telangiectasias most  numerous over the posterior right calf and scattered over the medial lateral thighs and medial and lateral legs.  I used a total of 2 syringes of 0.5% polidocanol foam.    We proceeded to the operating room, had the patient lie supine on the operating table, then prepped and draped the left lower extremity in a sterile fashion.  We then took a timeout to confirm the appropriate operative site and procedure: Left lower extremity cosmetic phlebectomies (ultrasound-guided and revision cosmetic sclerotherapy already performed).     Phlebectomies  Tumescent anesthetic was injected around each of the marked varicosities the block allowed to take effect.  We made stab wounds beside each of the marked varicosities with 11 scalpel, retrieved the veins with either vein hooks or marixa hooks, clamped them with mosquito clamps and a avulsed them.  Hemostasis was secured with pressure.    After completing the phlebectomies, the leg was cleaned with saline solution and petroleum jelly applied to the skin.  We then dressed the leg with ABD pads, cast padding and an Ace bandage from the toes to the groin.  We observed the patient for 30 minutes to ensure excellent stasis then allowed her to walk to her car and drive herself home.  Post procedure instructions were given in verbal and written form to the patient  who voiced understanding and whose questions were answered.    The patient tolerated the procedure well without evidence of allergic reaction or other complications and will return in 48 hours for wound check and dressing change.    Phlebectomy    Date/Time: 3/14/2023 2:09 PM  Performed by: Dav Crawley MD  Authorized by: Dav Crawley MD     Time out: Immediately prior to the procedure a time out was called    Preparation: Patient was prepped and draped in usual sterile fashion    1st Assist: Dhara Prater CST/CSFKEN    Circulator: Connie Saleem RN    Procedure:  Phlebectomies  Type:   Cosmetic  Procedure side:  Left  Session:  Limited  Patient tolerance:  Patient tolerated the procedure well with no immediate complications  Sclerotherapy    Date/Time: 3/14/2023 2:10 PM  Performed by: Dav Crawley MD  Authorized by: Dav Crawley MD     Type:  Cosmetic  Session:  Full  Solution/Amount:  .5 POLIDOCANOL and 1% POLIDOCANOL  Syringes:  2 syringes (6 mL 0.5% polidocanol foam); 1 syringe (1.5 mL 1% polidocanol foam)  Patient tolerance:  Patient tolerated the procedure well with no immediate complications  Wrap/Hose:  Wraps        Flowsheet Data 3/14/2023   Procedure Start Time:  1:51 PM   Prep: Chloraprep   Side: Left   # PHLEB Sites: LEFT LE   Sedation taken: No   Pre Pt. Physical / Cognitive Limitations: WNL   TOTAL Tumescent Injected volume (ml): 75   Max Volume Tumescent (ml): 271   Post Pt. Physical / Cognitive Limitations: WNL   Procedure End Time:  1:57 PM   D/C Instructions given, states readiness to leave and escorted to car: Yes       C Fidencio Crawley MD    Dictated using Dragon voice recognition software which may result in transcription errors      Again, thank you for allowing me to participate in the care of your patient.        Sincerely,        Dav Crawley MD

## 2023-03-16 ENCOUNTER — ALLIED HEALTH/NURSE VISIT (OUTPATIENT)
Dept: VASCULAR SURGERY | Facility: CLINIC | Age: 68
End: 2023-03-16
Payer: MEDICARE

## 2023-03-16 DIAGNOSIS — Z09 POSTOP CHECK: Primary | ICD-10-CM

## 2023-03-16 PROCEDURE — 99207 PR NO CHARGE NURSE ONLY: CPT

## 2023-03-16 NOTE — PATIENT INSTRUCTIONS
Vein Removal (Phlebectomy)  Common Things to Expect    Small lumps may develop beneath phlebectomy sites. This is a normal step in healing.  These should not be painful, but may be tender to the touch. It can take 6 weeks to 3 months for these lumps/firmness to resolve.  Bruising will look worse before it looks better and can last for 4-6 weeks.  Ankle swelling is not uncommon and may last 4-6 weeks.       If you are experiencing any of the following symptoms, please seek immediate medical attention at your local emergency department.  - Significant pain in the back of the calf possibly with difficulty walking  - Significant swelling and/or tenderness in the back of the calf  - Redness that continues to spread  - Chest pain and/or shortness of breath       SCLEROTHERAPY AFTER CARE  Immediately:  After treatment, walk for 10-15 minutes before getting in your car.  If your trip home is more than 1 hour, stop and walk around for 5-10 minutes. Avoid sitting or standing for extended periods.   First 24 hours: Wear your compression continuously, even while in bed. After the 24 hours, you may shower if you want to. Put your hose back on, unless you are going to bed. You should NOT wear compression to bed after the first 24 hours. You may fly the next day, but wear your compression.   For 5 days: Wear the compression hose for waking hours only. You may continue to wear them longer than 5 days if you prefer.   For days 5-7: Walking is encouraged, as it promotes efficient circulation in your veins. You may do activities that raise your heart rate, but do NOT run, jog, do high impact aerobics, or weight lifting. After 7 days, no activity restrictions.    Shaving: Wait a few days to shave or apply lotion.   Bathing: Do NOT take hot baths or sit in a hot tub for 7-10 days.    For 1 year: Wear SPF 30 sunscreen on your legs when in the sun. This is very important! It helps prevent darkening of the skin at the injection sites.    Medications: You may resume your usual medications, including aspirin or ibuprofen.    Common Things to Expect  -  Compression must be worn for the first 24 hours and then during the day for 5-7 days.    -  If larger veins are treated with ultrasound-guided sclerotherapy, you will have redness, firmness, tenderness, and swelling.  This firmness and tenderness may take 3-6 months to resolve. Ibuprofen and compression hose will aid in this process.    -  You will have bruising that can last up to 3 weeks. Most fading of the veins will occur between 3 and 6 weeks after treatment.    -  You may notice brown discoloration (hyperpigmentation) at the treatment site.  This should fade with time, but will take 3 months to 1 year to fully heal.     -  Some treated veins may look darker because of trapped blood within the vein. This trapped blood can be removed at a minimum of 1 month following treatment. Larger veins are more likely to develop trapped blood.    -  It is very important for you to use at least SPF 30 sunscreen in order to help prevent the discoloration of your skin.    -  Migraines rarely occur following sclerotherapy, but are more likely in patients with a history of migraines.  Treat as you would any other migraine.

## 2023-03-16 NOTE — PROGRESS NOTES
March 16, 2023    Vein Clinic Postoperative Nurse Note    Patient is here for her 48 hour postoperative visit.    Procedure: Left leg 0.5 unit phlebs ($), right leg u/s sclero ($) and bilateral leg sclero ($)  Procedure Date: 3/14/23  Surgeon: Dr. Crawley    Physical Exam: Incisions are approximated without signs of infection.  Ecchymosis: moderate to left proximal medial calf (4 phlebs)  Swelling: minimal  Paresthesia: pt denies numbness    Patient Questions or Concerns: Pt is doing well and has no concerns. Denies pain.    Assisted pt with donning her thigh high compression stockings.    Reviewed postoperative instructions with patient and provided her with written material of common things to expect from her procedure.    Patient's Next Vein Clinic Appointment: 6 week post op and more sclero ($) with Dr. Crawley (4/26/23).    Connie Saleem RN  [facility]

## 2023-04-09 ENCOUNTER — HEALTH MAINTENANCE LETTER (OUTPATIENT)
Age: 68
End: 2023-04-09

## 2023-04-26 ENCOUNTER — OFFICE VISIT (OUTPATIENT)
Dept: VASCULAR SURGERY | Facility: CLINIC | Age: 68
End: 2023-04-26
Payer: MEDICARE

## 2023-04-26 DIAGNOSIS — I78.1 SPIDER TELANGIECTASIS OF SKIN: Primary | ICD-10-CM

## 2023-04-26 PROCEDURE — S9999 SALES TAX: HCPCS | Performed by: SURGERY

## 2023-04-26 PROCEDURE — 36468 NJX SCLRSNT SPIDER VEINS: CPT | Performed by: SURGERY

## 2023-04-26 NOTE — LETTER
4/26/2023         RE: Ada Rock  [address] [place] [address]        Dear Colleague,    Thank you for referring your patient, Ada Rock, to the [facility]IN [facility] [place]. Please see a copy of my visit note below.        Vein Clinic Sclerotherapy Note     Indications:  1.  Residual, bilateral extremity spider telangiectasias and reticular veins of cosmetic concern  2.  Status post cosmetic sclerotherapy right posterior calf varicose vein and left medial calf phlebectomies approximately 6 weeks ago     Procedure:  Bilateral lower extremity cosmetic sclerotherapy     Procedure description  Details of procedure including risks of allergic reaction, deep vein thrombosis, ulceration, superficial thrombophlebitis and hyperpigmentation were discussed.  The patient voiced understanding and wished to proceed.  Informed consent was obtained.    The patient was happy that the varicosities of the left medial calf were gone.  She had noted what appeared to be new, small, blue telangiectasias in this area.    On exam, there were areas of toes ectatic matting on the left medial calf, likely related to her phlebectomies.    I inspected the right lower extremity and noted trapped blood within the varicosity on the posteromedial and posterior right calf which had been injected with ultrasound-guided sclerotherapy.    I imaged the left medial calf with ultrasound and note that the great saphenous vein appears to be very superficial and courses along the posterior edge of this line of post phlebectomy telangiectasias, likely contributing to the process.  I showed this to the patient and discussed the fact that the matting may be related to venous hypertension from this veins essentially deep to these areas.    I do not feel that this is the right time of year to inject this larger vein as it would likely cause hyperpigmentation and some trapped blood which will take some time to heal over the  summer.  I would therefore prefer to wait to treat this vein in the fall if the matting does not resolve as I expect it to.  I will inject these postlumpectomy telangiectasias at no charge to the patient if they do not resolve with today's treatment.    I donned the Syris headlight and injected telangiectasias along the areas of phlebectomies on the left medial calf and other  telangiectasias along the anterior aspect of the left leg extending down to just above the ankle.    I then injected telangiectasias over the anteromedial right leg.  We used a total of 6 mL of foam.  I had the patient perform ankle pumps.    I did have the patient turn prone but did not note significant telangiectasias on the posterior calf.    I cleaned the area of trapped blood on the posterior aspect of the right calf with alcohol, made stab wounds with 18-gauge needle and expressed thrombus.    We cleaned her leg, had her don compression, then had her walk for 10 minutes.  Was quite rob in telling the patient that the padding techniques may take up to a year to heal as well as it we will.  I feel like I injected several of these areas well today and I incompetent that it will begin to fade.    We will see her sometime in the fall for additional sclerotherapy.  She will return sooner if she has concerns or questions.    Sclerotherapy    Date/Time: 4/26/2023 12:22 PM    Performed by: Dav Crawley MD  Authorized by: Dav Crawley MD    Time out: Immediately prior to the procedure a time out was called    Type:  Cosmetic  Session:  Limited  Procedure side:  Bilateral  Solution/Amount:  .5 POLIDOCANOL  Syringes:  2  Evacuation of intraluminal thrombus under sterile conditions without complications.    Patient tolerance:  Patient tolerated the procedure well with no immediate complications  Wrap/Hose:  Martir Crawley MD    Dictated using Dragon voice recognition software which may result in  transcription errors      Again, thank you for allowing me to participate in the care of your patient.        Sincerely,        Dav Crawley MD

## 2023-04-26 NOTE — PROGRESS NOTES
Vein Clinic Sclerotherapy Note     Indications:  1.  Residual, bilateral extremity spider telangiectasias and reticular veins of cosmetic concern  2.  Status post cosmetic sclerotherapy right posterior calf varicose vein and left medial calf phlebectomies approximately 6 weeks ago     Procedure:  Bilateral lower extremity cosmetic sclerotherapy     Procedure description  Details of procedure including risks of allergic reaction, deep vein thrombosis, ulceration, superficial thrombophlebitis and hyperpigmentation were discussed.  The patient voiced understanding and wished to proceed.  Informed consent was obtained.    The patient was happy that the varicosities of the left medial calf were gone.  She had noted what appeared to be new, small, blue telangiectasias in this area.    On exam, there were areas of toes ectatic matting on the left medial calf, likely related to her phlebectomies.    I inspected the right lower extremity and noted trapped blood within the varicosity on the posteromedial and posterior right calf which had been injected with ultrasound-guided sclerotherapy.    I imaged the left medial calf with ultrasound and note that the great saphenous vein appears to be very superficial and courses along the posterior edge of this line of post phlebectomy telangiectasias, likely contributing to the process.  I showed this to the patient and discussed the fact that the matting may be related to venous hypertension from this veins essentially deep to these areas.    I do not feel that this is the right time of year to inject this larger vein as it would likely cause hyperpigmentation and some trapped blood which will take some time to heal over the summer.  I would therefore prefer to wait to treat this vein in the fall if the matting does not resolve as I expect it to.  I will inject these postlumpectomy telangiectasias at no charge to the patient if they do not resolve with today's treatment.    I  donned the Syris headlight and injected telangiectasias along the areas of phlebectomies on the left medial calf and other  telangiectasias along the anterior aspect of the left leg extending down to just above the ankle.    I then injected telangiectasias over the anteromedial right leg.  We used a total of 6 mL of foam.  I had the patient perform ankle pumps.    I did have the patient turn prone but did not note significant telangiectasias on the posterior calf.    I cleaned the area of trapped blood on the posterior aspect of the right calf with alcohol, made stab wounds with 18-gauge needle and expressed thrombus.    We cleaned her leg, had her don compression, then had her walk for 10 minutes.  Was quite rob in telling the patient that the padding techniques may take up to a year to heal as well as it we will.  I feel like I injected several of these areas well today and I incompetent that it will begin to fade.    We will see her sometime in the fall for additional sclerotherapy.  She will return sooner if she has concerns or questions.    Sclerotherapy    Date/Time: 4/26/2023 12:22 PM    Performed by: Dav Crawley MD  Authorized by: Dav Crawley MD    Time out: Immediately prior to the procedure a time out was called    Type:  Cosmetic  Session:  Limited  Procedure side:  Bilateral  Solution/Amount:  .5 POLIDOCANOL  Syringes:  2  Evacuation of intraluminal thrombus under sterile conditions without complications.    Patient tolerance:  Patient tolerated the procedure well with no immediate complications  Wrap/Hose:  Martir Crawley MD    Dictated using Dragon voice recognition software which may result in transcription errors

## 2024-06-15 ENCOUNTER — HEALTH MAINTENANCE LETTER (OUTPATIENT)
Age: 69
End: 2024-06-15

## 2025-03-01 ENCOUNTER — HEALTH MAINTENANCE LETTER (OUTPATIENT)
Age: 70
End: 2025-03-01

## 2025-06-21 ENCOUNTER — HEALTH MAINTENANCE LETTER (OUTPATIENT)
Age: 70
End: 2025-06-21